# Patient Record
Sex: MALE
[De-identification: names, ages, dates, MRNs, and addresses within clinical notes are randomized per-mention and may not be internally consistent; named-entity substitution may affect disease eponyms.]

---

## 2017-07-19 NOTE — C.PDOC
History Of Present Illness


Patient is a 66 y/o male presents to the emergency department for evaluation of 

lower abdominal pain radiating down to his testicles. Pt states he has 

difficulty passing urine. Otherwise, denies any n/v/d, or fever. History 

translated by RN. 





Time Seen by Provider: 17 14:47


Chief Complaint (Nursing): Male Genitourinary


History Per: Patient


History/Exam Limitations: no limitations


Onset/Duration Of Symptoms: Days


Current Symptoms Are (Timing): Still Present


Quality Of Discomfort: "Pain"


Associated Symptoms: Urinary Symptoms.  denies: Fever, Chills, Nausea, Vomiting

, Diarrhea, Loss Of Appetite, Back Pain, Chest Pain, Constipation


Alleviating Factors: None


Recent travel outside of the [place]: No


Additional History Per: Patient





Past Medical History


Reviewed: Historical Data, Nursing Documentation, Vital Signs


Vital Signs: 


 Last Vital Signs











Temp  98.5 F   17 18:18


 


Pulse  81   17 18:18


 


Resp  18   17 18:18


 


BP  134/90   17 18:18


 


Pulse Ox  95   17 18:44














- Medical History


PMH: Atrial Fibrillation, Diabetes, HTN, Hypercholesterolemia, Hypothyroidism


   Denies: Chronic Kidney Disease


Surgical History: Cholecystectomy


Family History: States: Unknown Family Hx





- Social History


Hx Alcohol Use: No


Hx Substance Use: No





- Immunization History


Hx Tetanus Toxoid Vaccination: No


Hx Influenza Vaccination: No


Hx Pneumococcal Vaccination: No





Review Of Systems


Except As Marked, All Systems Reviewed And Found Negative.


Constitutional: Negative for: Fever, Chills


Gastrointestinal: Positive for: Abdominal Pain.  Negative for: Nausea, Vomiting

, Diarrhea


Genitourinary: Negative for: Dysuria, Frequency, Hematuria, Penile Discharge, 

Rash, Penile Pain


Musculoskeletal: Negative for: Back Pain


Skin: Negative for: Rash





Physical Exam





- Physical Exam


Appears: Non-toxic, No Acute Distress


Skin: Normal Color, Warm, Dry


Head: Atraumatic, Normacephalic


Eye(s): bilateral: Normal Inspection


Neck: Supple


Chest: Symmetrical


Cardiovascular: Rhythm Regular, No Murmur


Respiratory: Normal Breath Sounds, No Accessory Muscle Use, No Rales, No Rhonchi

, No Wheezing


Gastrointestinal/Abdominal: Soft, No Tenderness, No Guarding, No Rebound, Other 

(obese abdomen)


Male Genital: No Testicular Tenderness, No Testicular Swelling, No Inguinal 

Tenderness, No Inguinal Swelling, No Scrotal Swelling


Extremity: Bilateral: Atraumatic, Normal ROM


Neurological/Psych: Oriented x3, Normal Speech, Normal Cognition





ED Course And Treatment





- Laboratory Results


Result Diagrams: 


 17 16:07





 17 16:07


Lab Interpretation: No Acute Changes


ECG: Interpreted By Me


ECG Rhythm: Atrial Fibrillation


ECG Interpretation: No Acute Changes


Rate From EC


O2 Sat by Pulse Oximetry: 95


Pulse Ox Interpretation: Normal





- CT Scan/US


  ** No standard instances


Other Rad Studies (CT/US): Read By Radiologist, Radiology Report Reviewed


CT/US Interpretation: FINDINGS:  There is limited evaluation of the solid 

organs without the administration of IV contrast.  LOWER THORAX:  No focal 

consolidation. No pleural effusion.  No pneumothorax.  Partially imaged 

cardiomegaly.  Small hiatal hernia.  LIVER:  Unremarkable unenhanced 

appearance.  GALLBLADDER AND BILE DUCTS:  Cholecystectomy.  PANCREAS:  Fatty 

atrophy of the pancreas.  SPLEEN:  Unremarkable unenhanced appearance.  ADRENALS

:  Unremarkable.  KIDNEYS AND URETERS:  No hydronephrosis or obstructing renal 

calculus. 2.2 cm exophytic right upper pole renal lesion measures approximately 

16 HU, indeterminate. Right upper pole 2.4 cm right upper pole renal lesion 

measures approximately 9 HU, compatible with a cyst.  BLADDER:  Decompressed 

urinary bladder precludes adequate evaluation.  REPRODUCTIVE:  The prostate 

gland measures approximately 4.5 x 4.5 cm.  APPENDIX:  The appendix appears 

within normal limits of caliber. No secondary signs of acute appendicitis.  

BOWEL:  The stomach is nondistended.  Lack of oral contrast limits evaluation 

for bowel pathology.   The bowel loops appear within normal limits of caliber 

without evidence of intestinal obstruction.  PERITONEUM:  No significant free 

fluid. No definite free air.  LYMPH NODES:  No bulky lymphadenopathy 

identified.  VASCULATURE:  Atherosclerotic calcifications of the aorta. No 

aortic aneurysm.  BONES:  Osseous demineralization.  Degenerative changes.  

Vacuum disc phenomenon at T12-L1, L1-L2, and L2-L3.  OTHER FINDINGS:  Fat 

containing umbilical hernia.  IMPRESSION:  2.2 cm exophytic right upper pole 

renal lesion measures approximately 16 HU, indeterminate. Right upper pole 2.4 

cm right upper pole renal lesion measures approximately 9 HU, compatible with a 

cyst.  Cholecystectomy.  Enlarged prostate gland.  Recommend correlation with 

PSA.  Fat containing umbilical hernia.  Additional findings as above.


Progress Note: Urinalysis, blood work,  EKG, Abd & pelvis CT ordered and 

reviewed. Patient was given IV fluids. Spoke with Dr. Hood who agrees with 

plan and discharge.


Reassessment Condition: Unchanged





- Physician Consult Information


Physician Contacted: Reg Hood


Outcome Of Conversation: refer to urologist Dr Lim





Disposition





- Disposition


Referrals: 


Yogi Gonzalez MD [Staff Provider] - 


Reg Hood MD [IM] - 


Disposition: HOME/ ROUTINE


Disposition Time: 18:00


Condition: GOOD


Additional Instructions: 


Return to ED if any increase symptoms


Instructions:  Benign Prostatic Hypertrophy (ED)


Print Language: Serbian





- Clinical Impression


Clinical Impression: 


 Abdominal pain, BPH (benign prostatic hyperplasia)








- PA / NP / Resident Statement


MD/DO has reviewed & agrees with the documentation as recorded.





- Scribe Statement


The provider has reviewed the documentation as recorded by the Sonalibnava Dial





All medical record entries made by the Sonalibnava were at my direction and 

personally dictated by me. I have reviewed the chart and agree that the record 

accurately reflects my personal performance of the history, physical exam, 

medical decision making, and the department course for this patient. I have 

also personally directed, reviewed, and agree with the discharge instructions 

and disposition.

## 2017-07-19 NOTE — CT
PROCEDURE:  CT Abdomen and Pelvis without Oral or IV contrast.



HISTORY:

Pain



COMPARISON:

None available.



TECHNIQUE:

Contiguous axial images of the abdomen and pelvis. No oral or IV 

contrast administered. Coronal and Sagittal reformats generated. 



Radiation dose:



Total exam DLP = 1170.58 mGy-cm.



This CT exam was performed using one or more of the following dose 

reduction techniques: Automated exposure control, adjustment of the 

mA and/or kV according to patient size, and/or use of iterative 

reconstruction technique.



FINDINGS:

There is limited evaluation of the solid organs without the 

administration of IV contrast.



LOWER THORAX:

No focal consolidation. No pleural effusion.  No pneumothorax.  



Partially imaged cardiomegaly. 



Small hiatal hernia. 



LIVER:

Unremarkable unenhanced appearance.



GALLBLADDER AND BILE DUCTS:

Cholecystectomy. 



PANCREAS:

Fatty atrophy of the pancreas.



SPLEEN:

Unremarkable unenhanced appearance. 



ADRENALS:

Unremarkable. 



KIDNEYS AND URETERS:

No hydronephrosis or obstructing renal calculus. 2.2 cm exophytic 

right upper pole renal lesion measures approximately 16 HU, 

indeterminate. Right upper pole 2.4 cm right upper pole renal lesion 

measures approximately 9 HU, compatible with a cyst. 



BLADDER:

Decompressed urinary bladder precludes adequate evaluation.



REPRODUCTIVE:

The prostate gland measures approximately 4.5 x 4.5 cm.



APPENDIX:

The appendix appears within normal limits of caliber. No secondary 

signs of acute appendicitis.



BOWEL:

The stomach is nondistended. 



Lack of oral contrast limits evaluation for bowel pathology.   The 

bowel loops appear within normal limits of caliber without evidence 

of intestinal obstruction.



PERITONEUM:

No significant free fluid. No definite free air.



LYMPH NODES:

No bulky lymphadenopathy identified.



VASCULATURE:

Atherosclerotic calcifications of the aorta. No aortic aneurysm. 



BONES:

Osseous demineralization.  Degenerative changes.  Vacuum disc 

phenomenon at T12-L1, L1-L2, and L2-L3.



OTHER FINDINGS:

Fat containing umbilical hernia. 



IMPRESSION:

2.2 cm exophytic right upper pole renal lesion measures approximately 

16 HU, indeterminate. Right upper pole 2.4 cm right upper pole renal 

lesion measures approximately 9 HU, compatible with a cyst.  



Cholecystectomy. 



Enlarged prostate gland.  Recommend correlation with PSA. 



Fat containing umbilical hernia. 



Additional findings as above.

## 2017-07-20 NOTE — CARD
--------------- APPROVED REPORT --------------





EKG Measurement

Heart [id number]

[id number]

[id number]

[id number]



<Conclusion>

Atrial fibrillation with premature ventricular or aberrantly 

conducted complexes

Abnormal ECG

## 2018-08-06 ENCOUNTER — HOSPITAL ENCOUNTER (INPATIENT)
Dept: HOSPITAL 31 - C.ER | Age: 68
LOS: 4 days | Discharge: HOME | DRG: 683 | End: 2018-08-10
Attending: INTERNAL MEDICINE | Admitting: INTERNAL MEDICINE
Payer: MEDICARE

## 2018-08-06 VITALS — RESPIRATION RATE: 20 BRPM

## 2018-08-06 VITALS — BODY MASS INDEX: 39.5 KG/M2

## 2018-08-06 DIAGNOSIS — I50.30: ICD-10-CM

## 2018-08-06 DIAGNOSIS — N18.3: ICD-10-CM

## 2018-08-06 DIAGNOSIS — R80.9: ICD-10-CM

## 2018-08-06 DIAGNOSIS — N17.9: Primary | ICD-10-CM

## 2018-08-06 DIAGNOSIS — T36.95XA: ICD-10-CM

## 2018-08-06 DIAGNOSIS — E03.9: ICD-10-CM

## 2018-08-06 DIAGNOSIS — E66.9: ICD-10-CM

## 2018-08-06 DIAGNOSIS — I48.91: ICD-10-CM

## 2018-08-06 DIAGNOSIS — M10.9: ICD-10-CM

## 2018-08-06 DIAGNOSIS — M25.571: ICD-10-CM

## 2018-08-06 DIAGNOSIS — E11.22: ICD-10-CM

## 2018-08-06 DIAGNOSIS — Z79.4: ICD-10-CM

## 2018-08-06 DIAGNOSIS — E78.00: ICD-10-CM

## 2018-08-06 DIAGNOSIS — Z79.01: ICD-10-CM

## 2018-08-06 DIAGNOSIS — I13.0: ICD-10-CM

## 2018-08-06 DIAGNOSIS — Z87.891: ICD-10-CM

## 2018-08-06 DIAGNOSIS — E87.6: ICD-10-CM

## 2018-08-06 LAB
ALBUMIN SERPL-MCNC: 4.4 G/DL (ref 3.5–5)
ALBUMIN/GLOB SERPL: 1 {RATIO} (ref 1–2.1)
ALT SERPL-CCNC: 31 U/L (ref 21–72)
APTT BLD: 41 SECONDS (ref 21–34)
AST SERPL-CCNC: 32 U/L (ref 17–59)
BACTERIA #/AREA URNS HPF: (no result) /[HPF]
BASOPHILS # BLD AUTO: 0.1 K/UL (ref 0–0.2)
BASOPHILS NFR BLD: 0.6 % (ref 0–2)
BILIRUB UR-MCNC: NEGATIVE MG/DL
BNP SERPL-MCNC: 1250 PG/ML (ref 0–900)
BUN SERPL-MCNC: 106 MG/DL (ref 9–20)
CALCIUM SERPL-MCNC: 9.4 MG/DL (ref 8.6–10.4)
EOSINOPHIL # BLD AUTO: 0 K/UL (ref 0–0.7)
EOSINOPHIL NFR BLD: 0.5 % (ref 0–4)
ERYTHROCYTE [DISTWIDTH] IN BLOOD BY AUTOMATED COUNT: 12.5 % (ref 11.5–14.5)
GFR NON-AFRICAN AMERICAN: 18
GLUCOSE UR STRIP-MCNC: NORMAL MG/DL
HGB BLD-MCNC: 14.2 G/DL (ref 12–18)
INR PPP: 2.5
LEUKOCYTE ESTERASE UR-ACNC: (no result) LEU/UL
LYMPHOCYTES # BLD AUTO: 0.9 K/UL (ref 1–4.3)
LYMPHOCYTES NFR BLD AUTO: 10.9 % (ref 20–40)
MCH RBC QN AUTO: 30.7 PG (ref 27–31)
MCHC RBC AUTO-ENTMCNC: 34.8 G/DL (ref 33–37)
MCV RBC AUTO: 88.1 FL (ref 80–94)
MONOCYTES # BLD: 0.9 K/UL (ref 0–0.8)
MONOCYTES NFR BLD: 11 % (ref 0–10)
NEUTROPHILS # BLD: 6.6 K/UL (ref 1.8–7)
NEUTROPHILS NFR BLD AUTO: 77 % (ref 50–75)
NRBC BLD AUTO-RTO: 0 % (ref 0–2)
PH UR STRIP: 5 [PH] (ref 5–8)
PLATELET # BLD: 184 K/UL (ref 130–400)
PMV BLD AUTO: 10.7 FL (ref 7.2–11.7)
PROT UR STRIP-MCNC: (no result) MG/DL
PROTHROMBIN TIME: 27 SECONDS (ref 9.7–12.2)
RBC # BLD AUTO: 4.63 MIL/UL (ref 4.4–5.9)
RBC # UR STRIP: NEGATIVE /UL
SP GR UR STRIP: 1.01 (ref 1–1.03)
SQUAMOUS EPITHIAL: < 1 /HPF (ref 0–5)
TROPONIN I SERPL-MCNC: 0.03 NG/ML (ref 0–0.12)
URATE SERPL-MCNC: 15.9 MG/DL (ref 3.5–8.5)
UROBILINOGEN UR-MCNC: NORMAL MG/DL (ref 0.2–1)
WBC # BLD AUTO: 8.6 K/UL (ref 4.8–10.8)

## 2018-08-06 RX ADMIN — OXYCODONE HYDROCHLORIDE AND ACETAMINOPHEN PRN TAB: 5; 325 TABLET ORAL at 22:07

## 2018-08-06 RX ADMIN — HUMAN INSULIN SCH: 100 INJECTION, SOLUTION SUBCUTANEOUS at 21:11

## 2018-08-06 NOTE — CP.PCM.HP
History of Present Illness





- History of Present Illness


History of Present Illness: 





Patient is admitted for progressive renal failure and decreasing urine output 

and right ankle pain.


Patient has a history of hypertension and atrial fibrillation history of 

diastolic heart failure type 2 diabetes recently patients creatinine had gone 

up to 1.5-1.6.  [place] and came back after 1 

month and a repeat blood test showed creatinine had gone up to 3.4.  Patient 

was also complaining of urinary symptoms and outpatient urine culture were 

positive for E. coli more than 100,000.  Patient was given Augmentin which was 

sensitive to E. coli.  Patient took some antibiotics and started complaining of 

more decreasing urine output and swelling and pain on the right ankle patient 

stated that the pain in the right ankle has been present for the last few 

months.  In the emergency room patients creatinine was 3.5 and BUN was 101 

with a potassium of 2.9.  Patient is admitted now for further evaluation.





Present on Admission





- Present on Admission


Any Indicators Present on Admission: No





Past Patient History





- Past Medical History & Family History


Past Medical History?: Yes





- Past Social History


Smoking Status: Former Smoker





- CARDIAC


Hx Atrial Fibrillation: Yes


Hx Hypercholesterolemia: Yes


Hx Hypertension: Yes





- PULMONARY


Hx Respiratory Disorders: No





- NEUROLOGICAL


Hx Neurological Disorder: Yes


Hx Dizziness: Yes


Hx Syncope: Yes





- HEENT


Hx HEENT Problems: No





- RENAL


Hx Chronic Kidney Disease: No





- ENDOCRINE/METABOLIC


Hx Hypothyroidism: Yes





- HEMATOLOGICAL/ONCOLOGICAL


Hx Blood Disorders: No


Hx Blood Transfusions: No





- INTEGUMENTARY


Hx Dermatological Problems: No





- MUSCULOSKELETAL/RHEUMATOLOGICAL


Hx Musculoskeletal Disorders: Yes


Hx Falls: Yes





- GASTROINTESTINAL


Hx Gastrointestinal Disorders: No





- GENITOURINARY/GYNECOLOGICAL


Hx Genitourinary Disorders: No





- PSYCHIATRIC


Hx Substance Use: No





- SURGICAL HISTORY


Hx Cholecystectomy: Yes





- ANESTHESIA


Hx Anesthesia: Yes


Hx Anesthesia Reactions: No





Meds


Allergies/Adverse Reactions: 


 Allergies











Allergy/AdvReac Type Severity Reaction Status Date / Time


 


No Known Allergies Allergy   Verified 08/06/18 13:09














Results





- Vital Signs


Recent Vital Signs: 





 Last Vital Signs











Temp  99.7 F H  08/06/18 13:17


 


Pulse  87   08/06/18 15:33


 


Resp  20   08/06/18 15:33


 


BP  111/60   08/06/18 15:33


 


Pulse Ox  100   08/06/18 15:33














- Labs


Result Diagrams: 


 08/09/18 07:13





 08/09/18 07:13


Labs: 





 Laboratory Results - last 24 hr











  08/06/18 08/06/18 08/06/18





  14:00 14:00 14:00


 


WBC  8.6  D  


 


RBC  4.63  


 


Hgb  14.2  


 


Hct  40.8  


 


MCV  88.1  


 


MCH  30.7  


 


MCHC  34.8  


 


RDW  12.5  


 


Plt Count  184  


 


MPV  10.7  


 


Neut % (Auto)  77.0 H  


 


Lymph % (Auto)  10.9 L  


 


Mono % (Auto)  11.0 H  


 


Eos % (Auto)  0.5  


 


Baso % (Auto)  0.6  


 


Neut # (Auto)  6.6  


 


Lymph # (Auto)  0.9 L  


 


Mono # (Auto)  0.9 H  


 


Eos # (Auto)  0.0  


 


Baso # (Auto)  0.1  


 


PT   27.0 H 


 


INR   2.5 


 


APTT   41 H 


 


Sodium    139


 


Potassium    2.9 L


 


Chloride    91 L


 


Carbon Dioxide    32 H


 


Anion Gap    19


 


BUN    106 H* D


 


Creatinine    3.4 H


 


Est GFR ( Amer)    22


 


Est GFR (Non-Af Amer)    18


 


Random Glucose    149 H


 


Uric Acid    15.9 H


 


Calcium    9.4


 


Total Bilirubin    1.1


 


AST    32


 


ALT    31


 


Alkaline Phosphatase    73


 


Troponin I    0.0280


 


NT-Pro-B Natriuret Pep    1250 H


 


Total Protein    8.9 H


 


Albumin    4.4


 


Globulin    4.5 H


 


Albumin/Globulin Ratio    1.0

## 2018-08-06 NOTE — C.PDOC
History Of Present Illness


67 y/o male presents to the ED complaining of pain in the dorsum of right foot 

for the last 3 days. He denies having had similar symptoms in the past. Patient 

has tried taking anything for pain relief at home. Otherwise he denies any 

numbness, tingling, or weakness.





Time Seen by Provider: 18 13:24


Chief Complaint (Nursing): Upper Extremity Problem/Injury


History Per: Patient


History/Exam Limitations: no limitations


Onset/Duration Of Symptoms: Days


Current Symptoms Are (Timing): Still Present





Past Medical History


Reviewed: Historical Data, Nursing Documentation, Vital Signs


Vital Signs: 


 Last Vital Signs











Temp  99.7 F H  18 13:17


 


Pulse  87   18 15:33


 


Resp  20   18 15:33


 


BP  111/60   18 15:33


 


Pulse Ox  100   18 15:33














- Medical History


PMH: Atrial Fibrillation, Diabetes, HTN, Hypercholesterolemia, Hypothyroidism


   Denies: Chronic Kidney Disease


Surgical History: Cholecystectomy


Family History: States: Unknown Family Hx





- Social History


Hx Alcohol Use: No


Hx Substance Use: No





- Immunization History


Hx Tetanus Toxoid Vaccination: No


Hx Influenza Vaccination: No


Hx Pneumococcal Vaccination: No





Review Of Systems


Except As Marked, All Systems Reviewed And Found Negative.


Constitutional: Negative for: Fever


Musculoskeletal: Positive for: Foot Pain


Skin: Negative for: Rash, Lesions


Neurological: Negative for: Weakness, Numbness, Incoordination





Physical Exam





- Physical Exam


Appears: Non-toxic, No Acute Distress, Other (Obese  male)


Skin: Normal Color, Warm, No Rash


Head: Atraumatic, Normacephalic


Eye(s): bilateral: Normal Inspection


Oral Mucosa: Moist


Neck: Normal ROM, Supple


Chest: Symmetrical


Cardiovascular: Rhythm Regular, No Murmur


Respiratory: Normal Breath Sounds, No Rales, No Rhonchi, No Wheezing


Extremity: Normal ROM, Tenderness (to the dorsum of right foot), No Swelling, 

Other (No surrounding erythema or leg edema)


Pulses: Left Dorsalis Pedis: Normal, Right Dorsalis Pedis: Normal


Neurological/Psych: Oriented x3, Normal Speech, Normal Cranial Nerves





ED Course And Treatment





- Laboratory Results


Result Diagrams: 


 18 14:00





 18 14:00


Lab Interpretation: Abnormal (acute renal insuff c/w  BUN/Creat 106/3.4 H, 

+ hypokalemia)


ECG: Interpreted By Me


ECG Rhythm: Atrial Fibrillation


ECG Interpretation: Abnormal


Rate From EC


O2 Sat by Pulse Oximetry: 95 (RA)


Pulse Ox Interpretation: Normal





- Radiology


CXR: Interpreted by Me


CXR Interpretation: Yes: Heart Size





- Other Rad


  ** R foot


X-Ray: Interpreted by Me (no fx/disloc )


Reevaluation Time: 14:59


Reassessment Condition: Improved





- Physician Consult Information


Outcome Of Conversation: 1500: d/w Dr. Hood- ok to admit





Medical Decision Making


Medical Decision Making: 


R dorsal foot pain 


? gouty


Colchicine started


Toradol for pain 


Consider indomethacin, but with acute renal insuff may consider steroids and 

not NSAIDS for pain .





Hypotention and hypokalemia probably medication related


begin repleating





ARF, BUN/Creat 106/3.4 from baseline 31/1.9  from 2017


May be pre-renal- 


no chf on exam or CXR


hydration and monitor











Disposition


Doctor Will See Patient In The: Hospital


Counseled Patient/Family Regarding: Studies Performed, Diagnosis





- Disposition


Disposition: HOSPITALIZED


Disposition Time: 15:03


Condition: GOOD





- POA


Present On Arrival: None





- Clinical Impression


Clinical Impression: 


 Gout of foot due to renal impairment, Acute renal insufficiency, Hypokalemia








- Scribe Statement


The provider has reviewed the documentation as recorded by the Scribe (Kiana Mojica)


Provider Attestation: 





All medical record entries made by the Scribe were at my direction and 

personally dictated by me. I have reviewed the chart and agree that the record 

accurately reflects my personal performance of the history, physical exam, 

medical decision making, and the department course for this patient. I have 

also personally directed, reviewed, and agree with the discharge instructions 

and disposition.

## 2018-08-06 NOTE — RAD
Date of service: 



08/06/2018



PROCEDURE:  Right Foot Radiographs.



HISTORY:

dorsum R foot, no trauma, ? gout  



COMPARISON:

None.



FINDINGS:



BONES:

No evidence of acute displaced fracture nor dislocation. Small 

triangular-shaped bony density adjacent to the medial aspect base 

distal phalanx great toe could represent old incompletely fused 

fracture deformity. .  There is dorsal and plantar surface calcaneal 

enthesophyte formation. 



JOINTS:

Mild multi articular degenerative osteoarthritis 



SOFT TISSUES:

Calcification of the plantar fascia is present 



OTHER FINDINGS:

None.



IMPRESSION:

No evidence of acute displaced fracture nor dislocation. Small 

triangular-shaped bony density adjacent to the medial aspect base 

distal phalanx great toe could represent old incompletely fused 

fracture deformity. .  There is dorsal and plantar surface calcaneal 

enthesophyte formation.  



Mild multi articular degenerative osteoarthritis. . 



Calcification of the plantar fascia

## 2018-08-06 NOTE — RAD
Chest x-ray single frontal view 



History: Chest pain. 



Comparison: 07/20/2016 



Findings: 



Biapical pleural thickening with upper lobe granulomatous changes. 



Mild venous congestion. 



Right hilar prominence. 



Patchy increased markings at the left lung base with small left 

pleural effusion. 



Cardiomegaly. 



Enlarged ectatic aorta. 



Degenerative changes in the spine and shoulders. 



Impression:



Biapical pleural thickening with upper lobe granulomatous changes. 



Mild venous congestion. 



Right hilar prominence. 



Patchy increased markings at the left lung base with small left 

pleural effusion. 



Cardiomegaly. 



Enlarged ectatic aorta.

## 2018-08-07 LAB
ALBUMIN SERPL-MCNC: 4.2 G/DL (ref 3.5–5)
ALBUMIN/GLOB SERPL: 1.1 {RATIO} (ref 1–2.1)
ALT SERPL-CCNC: 27 U/L (ref 21–72)
AST SERPL-CCNC: 26 U/L (ref 17–59)
BASOPHILS # BLD AUTO: 0.1 K/UL (ref 0–0.2)
BASOPHILS NFR BLD: 0.9 % (ref 0–2)
BILIRUB UR-MCNC: NEGATIVE MG/DL
BUN SERPL-MCNC: 101 MG/DL (ref 9–20)
CALCIUM SERPL-MCNC: 8.8 MG/DL (ref 8.6–10.4)
EOSINOPHIL # BLD AUTO: 0.2 K/UL (ref 0–0.7)
EOSINOPHIL NFR BLD: 2.9 % (ref 0–4)
ERYTHROCYTE [DISTWIDTH] IN BLOOD BY AUTOMATED COUNT: 12.6 % (ref 11.5–14.5)
GFR NON-AFRICAN AMERICAN: 20
GLUCOSE UR STRIP-MCNC: NORMAL MG/DL
HGB BLD-MCNC: 13.8 G/DL (ref 12–18)
INR PPP: 3
LEUKOCYTE ESTERASE UR-ACNC: (no result) LEU/UL
LYMPHOCYTES # BLD AUTO: 1.5 K/UL (ref 1–4.3)
LYMPHOCYTES NFR BLD AUTO: 22.9 % (ref 20–40)
MCH RBC QN AUTO: 31.2 PG (ref 27–31)
MCHC RBC AUTO-ENTMCNC: 34.6 G/DL (ref 33–37)
MCV RBC AUTO: 90 FL (ref 80–94)
MONOCYTES # BLD: 0.7 K/UL (ref 0–0.8)
MONOCYTES NFR BLD: 10.3 % (ref 0–10)
NEUTROPHILS # BLD: 4.2 K/UL (ref 1.8–7)
NEUTROPHILS NFR BLD AUTO: 63 % (ref 50–75)
NRBC BLD AUTO-RTO: 0 % (ref 0–2)
PH UR STRIP: 6 [PH] (ref 5–8)
PLATELET # BLD: 172 K/UL (ref 130–400)
PMV BLD AUTO: 10.8 FL (ref 7.2–11.7)
PROT UR STRIP-MCNC: (no result) MG/DL
PROTHROMBIN TIME: 32.9 SECONDS (ref 9.7–12.2)
RBC # BLD AUTO: 4.42 MIL/UL (ref 4.4–5.9)
RBC # UR STRIP: NEGATIVE /UL
SP GR UR STRIP: 1.01 (ref 1–1.03)
SQUAMOUS EPITHIAL: 1 /HPF (ref 0–5)
UROBILINOGEN UR-MCNC: NORMAL MG/DL (ref 0.2–1)
WBC # BLD AUTO: 6.6 K/UL (ref 4.8–10.8)

## 2018-08-07 RX ADMIN — HUMAN INSULIN SCH: 100 INJECTION, SOLUTION SUBCUTANEOUS at 07:43

## 2018-08-07 RX ADMIN — HUMAN INSULIN SCH UNITS: 100 INJECTION, SOLUTION SUBCUTANEOUS at 12:20

## 2018-08-07 RX ADMIN — HUMAN INSULIN SCH: 100 INJECTION, SOLUTION SUBCUTANEOUS at 17:11

## 2018-08-07 RX ADMIN — HUMAN INSULIN SCH: 100 INJECTION, SOLUTION SUBCUTANEOUS at 21:07

## 2018-08-07 NOTE — CP.PCM.CON
History of Present Illness





- History of Present Illness


History of Present Illness: 


Nephrology Consultation Note:





Assessment: Stable


Acute Kidney Injury (N17.9) ? etiology, possibly due to pre--renal state. other 

differential include AIN due to recent Abx


Hypokalemia


Chronic Kidney Disease (N18.3) Stage3  with ? mg proteinuria (R80.9) ? due to 


Obesity, A fib on coumadin


Hyperuricemia


Hypothyroidism





Plan


No acute need for renal replacement therapy at this time. 


BP controlled without meds. Maintain hemodynamics stable. Avoid hypotension. 

Patient not on ACEI/ARB due to recent IRIS


Monitor Input/Output, daily weights and renal function with basic metabolic 

panel


continue with IVF as NS


d/c metformin due to serum Cr elevation. consider alternative meds to control 

sugar. check a1c 


can add uloric 40 mg/day for hyperuricemia. (deferred allopurniol due to 

interaction with coumadin but uloric is non-formulary)


hold lasix


supplement KDUR





Check urine analysis, spot protein/creatinine, albumin/creatinine ratio, urine 

for eosinophils. Check CPK, uric acid, renal and bladder sonogram


Check for 25-OH vitamin D, iPTH, phosphorus level. 





Dose meds/antibiotics for reduced GFR. Avoid fleets enema/magnesium based 

laxatives. Avoid nephrotoxins/NSAIDs/ iodinated contrast (unless needed 

emergently)


Glycemic control


Further work up/management as per primary team





Thanks for allowing me to participate in care of your patient. Will follow 

patient with you. Please call if any Qs. had d/w team and son with pt permission


Dr Tam Luis


Office: [phone] Cell: [phone] Fax: [phone]





Chief Complaint; kidney problem


Reason for consult: Acute Kidney Injury


HPI: Pt is a 68 M with hx of borderline diabetes Mellitus, Hypothyroidism, A 

fib on coumadin, CKD stage 3 with baseline cr 1.5-2.0 since 2016, chronic edema 

on lasix presented with complaints of foot pain and also found to have IRIS 

hence renal consulted. pt or son not aware about kidney disease in past


Denies OTC/herbal meds or NSAIDs


No recent iodinated contrast exposure. No obvious episodes of low BP.


pt reports decreased appetite for last 1-2 days and was recently treated with 

an antibiotics for UTI





ROS: 


Cardiovascular: No chest pain. 


Pulmonary: No shortness of breath 


Gastrointestinal: denies abdominal pain No nausea. No vomiting. 


Genitourinary: No pain while urinating. Denies blood in urine. reports protate 

issues in past


All other negative except as mentioned in HPI





Physical Examination:      


General Appearance: Comfortable, in no acute respiratory distress, co-operative 

. 


Vitals reviewed and noted as below


Head; Atraumatic, normocephalic


ENT: no ulcers no thrush. Tongue is midline. Oropharynx: no rash or ulcers.


EYES: Pupils are equal, round and reactive to light accommodation. Eye muscles 

and extraocular movement intact. Sclera is anicteric.


Neck; supple no lymphadenopathy, no thyromegaly or bruit


Lungs: Normal respiratory rate/effort. Breath sounds bilateral equal and clear


Heart: Normal rate. s1s2 normal. No rub or gallop. 


Extremities: no edema. No varicose veins. chronic hyperpigmented changes in leg 

skin noted


Neurological: Patient is alert, awake and oriented to person, place and time. 

No focal deficit. Strength bilateral appropriate and equal


Skin: Warm and dry. Normal turgor. No rash. Palpitation: Normal elasticity for 

age


Abdomen: Abdomen is soft. Bowel sounds +. There is no abdominal tenderness, no 

guarding/rigidity no organomegaly


Psych: limited insight and normal affect/mood


MSK: no joint tenderness or swelling. Digits and nails normal, no deformity


: kidney or bladder not palpable





Labs/imaging reviewed.


Past medical history, past surgical history, family history, social history, 

allergy reviewed and noted as below


Family hx: no hx of CKD. Rest non-contributory 





renal imaging b/l medical renal disease with increased echogenicity. no 

hydronephrosis.


uric acid 15.9


UA 2+ protein





Past Patient History





- Past Medical History & Family History


Past Medical History?: Yes





- Past Social History


Smoking Status: Former Smoker





- CARDIAC


Hx Atrial Fibrillation: Yes


Hx Hypercholesterolemia: Yes


Hx Hypertension: Yes





- PULMONARY


Hx Respiratory Disorders: No





- NEUROLOGICAL


Hx Neurological Disorder: Yes


Hx Dizziness: Yes


Hx Syncope: Yes





- HEENT


Hx HEENT Problems: No





- RENAL


Hx Chronic Kidney Disease: No





- ENDOCRINE/METABOLIC


Hx Hypothyroidism: Yes





- HEMATOLOGICAL/ONCOLOGICAL


Hx Blood Disorders: No


Hx Blood Transfusions: No





- INTEGUMENTARY


Hx Dermatological Problems: No





- MUSCULOSKELETAL/RHEUMATOLOGICAL


Hx Falls: Yes





- GASTROINTESTINAL


Hx Gastrointestinal Disorders: No





- GENITOURINARY/GYNECOLOGICAL


Hx Genitourinary Disorders: No





- PSYCHIATRIC


Hx Substance Use: No





- SURGICAL HISTORY


Hx Cholecystectomy: Yes


Hx Herniorrhaphy: Yes


Other/Comment: brain tumor removal in [place]





- ANESTHESIA


Hx Anesthesia: Yes


Hx Anesthesia Reactions: No





Meds


Allergies/Adverse Reactions: 


 Allergies











Allergy/AdvReac Type Severity Reaction Status Date / Time


 


No Known Allergies Allergy   Verified 08/06/18 13:09














- Medications


Medications: 


 Current Medications





Sodium Chloride (Sodium Chloride 0.9%)  1,000 mls @ 100 mls/hr IV .Q10H Pending sale to [facility]


   Last Admin: 08/07/18 14:08 Dose:  100 mls/hr


Insulin Human Regular (Novolin R)  0 unit SC ACHS Pending sale to [facility]


   PRN Reason: Protocol


   Last Admin: 08/07/18 12:20 Dose:  3 units


Levothyroxine Sodium (Synthroid)  200 mcg PO DAILY@0630 Pending sale to [facility]


   Last Admin: 08/07/18 07:00 Dose:  200 mcg


Oxycodone/Acetaminophen (Percocet 5/325 Mg Tab)  1 tab PO Q6H PRN


   PRN Reason: Pain, severe (8-10)


   Stop: 08/09/18 21:16


   Last Admin: 08/06/18 22:07 Dose:  1 tab


Pneumococcal Polyvalent Vaccine (Pneumovax 23 Vaccine)  0.5 ml IM .ONCE ONE


   Stop: 08/08/18 10:01


Warfarin Sodium (Coumadin)  6 mg PO DAILY@1800 ONE


   Stop: 08/07/18 18:01











Results





- Vital Signs


Recent Vital Signs: 


 Last Vital Signs











Temp  98.1 F   08/07/18 07:00


 


Pulse  91 H  08/07/18 07:15


 


Resp  20   08/07/18 07:00


 


BP  113/69   08/07/18 07:00


 


Pulse Ox  99   08/07/18 07:00














- Labs


Result Diagrams: 


 08/07/18 07:02





 08/07/18 07:02


Labs: 


 Laboratory Results - last 24 hr











  08/06/18 08/06/18 08/06/18





  13:11 16:45 20:39


 


WBC   


 


RBC   


 


Hgb   


 


Hct   


 


MCV   


 


MCH   


 


MCHC   


 


RDW   


 


Plt Count   


 


MPV   


 


Neut % (Auto)   


 


Lymph % (Auto)   


 


Mono % (Auto)   


 


Eos % (Auto)   


 


Baso % (Auto)   


 


Neut # (Auto)   


 


Lymph # (Auto)   


 


Mono # (Auto)   


 


Eos # (Auto)   


 


Baso # (Auto)   


 


PT   


 


INR   


 


Sodium   


 


Potassium   


 


Chloride   


 


Carbon Dioxide   


 


Anion Gap   


 


BUN   


 


Creatinine   


 


Est GFR ( Amer)   


 


Est GFR (Non-Af Amer)   


 


POC Glucose (mg/dL)  155 H  144 H  144 H


 


Random Glucose   


 


Calcium   


 


Total Bilirubin   


 


AST   


 


ALT   


 


Alkaline Phosphatase   


 


Total Protein   


 


Albumin   


 


Globulin   


 


Albumin/Globulin Ratio   


 


Urine Color   


 


Urine Clarity   


 


Urine pH   


 


Ur Specific Gravity   


 


Urine Protein   


 


Urine Glucose (UA)   


 


Urine Ketones   


 


Urine Blood   


 


Urine Nitrate   


 


Urine Bilirubin   


 


Urine Urobilinogen   


 


Ur Leukocyte Esterase   


 


Urine WBC (Auto)   


 


Urine RBC (Auto)   


 


Ur Squamous Epith Cells   


 


Urine Bacteria   














  08/06/18 08/07/18 08/07/18





  22:09 06:42 07:02


 


WBC    6.6


 


RBC    4.42


 


Hgb    13.8


 


Hct    39.8


 


MCV    90.0


 


MCH    31.2 H


 


MCHC    34.6


 


RDW    12.6


 


Plt Count    172


 


MPV    10.8


 


Neut % (Auto)    63.0


 


Lymph % (Auto)    22.9


 


Mono % (Auto)    10.3 H


 


Eos % (Auto)    2.9


 


Baso % (Auto)    0.9


 


Neut # (Auto)    4.2


 


Lymph # (Auto)    1.5


 


Mono # (Auto)    0.7


 


Eos # (Auto)    0.2


 


Baso # (Auto)    0.1


 


PT   


 


INR   


 


Sodium   


 


Potassium   


 


Chloride   


 


Carbon Dioxide   


 


Anion Gap   


 


BUN   


 


Creatinine   


 


Est GFR ( Amer)   


 


Est GFR (Non-Af Amer)   


 


POC Glucose (mg/dL)   129 H 


 


Random Glucose   


 


Calcium   


 


Total Bilirubin   


 


AST   


 


ALT   


 


Alkaline Phosphatase   


 


Total Protein   


 


Albumin   


 


Globulin   


 


Albumin/Globulin Ratio   


 


Urine Color  Yellow  


 


Urine Clarity  Clear  


 


Urine pH  5.0  


 


Ur Specific Gravity  1.014  


 


Urine Protein  2+ H  


 


Urine Glucose (UA)  Normal  


 


Urine Ketones  Negative  


 


Urine Blood  Negative  


 


Urine Nitrate  Negative  


 


Urine Bilirubin  Negative  


 


Urine Urobilinogen  Normal  


 


Ur Leukocyte Esterase  Neg  


 


Urine WBC (Auto)  2  


 


Urine RBC (Auto)  < 1  


 


Ur Squamous Epith Cells  < 1  


 


Urine Bacteria  Rare  














  08/07/18 08/07/18 08/07/18





  07:02 11:18 14:14


 


WBC   


 


RBC   


 


Hgb   


 


Hct   


 


MCV   


 


MCH   


 


MCHC   


 


RDW   


 


Plt Count   


 


MPV   


 


Neut % (Auto)   


 


Lymph % (Auto)   


 


Mono % (Auto)   


 


Eos % (Auto)   


 


Baso % (Auto)   


 


Neut # (Auto)   


 


Lymph # (Auto)   


 


Mono # (Auto)   


 


Eos # (Auto)   


 


Baso # (Auto)   


 


PT    32.9 H* D


 


INR    3.0


 


Sodium  142  


 


Potassium  3.4 L  


 


Chloride  95 L  


 


Carbon Dioxide  31 H  


 


Anion Gap  19  


 


BUN  101 H*  


 


Creatinine  3.1 H  


 


Est GFR ( Amer)  24  


 


Est GFR (Non-Af Amer)  20  


 


POC Glucose (mg/dL)   221 H 


 


Random Glucose  134 H  


 


Calcium  8.8  


 


Total Bilirubin  0.8  


 


AST  26  


 


ALT  27  


 


Alkaline Phosphatase  66  


 


Total Protein  7.9  


 


Albumin  4.2  


 


Globulin  3.7  


 


Albumin/Globulin Ratio  1.1  


 


Urine Color   


 


Urine Clarity   


 


Urine pH   


 


Ur Specific Gravity   


 


Urine Protein   


 


Urine Glucose (UA)   


 


Urine Ketones   


 


Urine Blood   


 


Urine Nitrate   


 


Urine Bilirubin   


 


Urine Urobilinogen   


 


Ur Leukocyte Esterase   


 


Urine WBC (Auto)   


 


Urine RBC (Auto)   


 


Ur Squamous Epith Cells   


 


Urine Bacteria

## 2018-08-07 NOTE — US
Date of service: 



08/07/2018



PROCEDURE:  Ultrasound of the Kidneys



HISTORY:

IRIS



COMPARISON:

None available.



TECHNIQUE:

Sonogram of the kidneys.



FINDINGS:



RIGHT KIDNEY:

Measures: 12.9 x 5.8 x 6.1 cm. 



The renal parenchyma is echogenic with poor corticomedullary 

differentiation indicative of intrinsic medical renal disease. No 

solid mass identified, urolithiasis or hydronephrosis. A simple 

exophytic cyst is is seen related to the upper midpole measuring 2.8 

x 2.4 by 2.7 cm with an intrarenal cyst at the upper midpole 

measuring 2.7 x 2.4 x 2.9 cm.  Both appear simple. 







LEFT KIDNEY:

Measures: 10.5 x 5.5 x 5.2 cm.



Echogenic renal parenchyma is appreciate with poor corticomedullary 

differentiation compatible with intrinsic medical renal disease. No 

obstructive uropathy, cyst or solid mass or perinephric fluid 

collection. 



OTHER FINDINGS:

Urinary bladder is limited volume at 72.5 cc which is a postvoid 

residual from an indeterminate volume.  The patient voided 

immediately prior to the examination unfortunately.



IMPRESSION:

Echogenic parenchyma is appreciate with very poor corticomedullary 

differentiation suggesting intrinsic medical disease.  No obstructive 

uropathy or solid renal parenchymal mass. Two simple cysts identified 

in the upper midpole right kidney as discussed above.

## 2018-08-07 NOTE — CP.PCM.PN
Subjective





- Date & Time of Evaluation


Date of Evaluation: 08/07/18


Time of Evaluation: 14:27





- Subjective


Subjective: 





CHIEF COMPLAINTS TODAY :


Patient is complaining of lower abdominal pain and oliguria.


The pain in the right ankle is slightly less





ROS.


HEENT :  N.


Resp :       No cough, wheezing ,pleuritic CP ,or hemoptysis 


Cardio :     No anginal  CP, PND, orthopnea, palpitation 


GI :           No abd.pain, n/v ,diarrhea or GI bleeding .


CNS : No headache, vertigo, focal deficit.


Musculoskel :  No joint swelling , right ankle pain


Derm :        No rash 


Psych :     Normal affect.


Ext :  No  swelling ,calf pain 





PE.


Pt. is alert awake in no distress.


V.S  As noted in the chart 


Head ,ear nose,throat and eyes : Normal.


Neck : Supple with normal carotids.


Lungs: Clear air entry.


Heart : S1 & S2 normal with S4. No murmur.


Abd : Soft non tender with normal bowel sounds.


Neuro : Moves all ext. with no localized deficit.


Ext : No edema with intact pulses.Non tender calves .  Tenderness in the right 

ankle


Derm : No rashes or decubitus ulcer.





LABS/RADIOLOGY: BUN is 101 creatinine is 3.0 and potassium is 3.4





ASSESSMENT/PLAN : 


Awaiting nephrology evaluation continue present medication











Objective





- Vital Signs/Intake and Output


Vital Signs (last 24 hours): 


 











Temp Pulse Resp BP Pulse Ox


 


 98.1 F   91 H  20   113/69   99 


 


 08/07/18 07:00  08/07/18 07:15  08/07/18 07:00  08/07/18 07:00  08/07/18 07:00








Intake and Output: 


 











 08/07/18 08/07/18





 11:59 23:59


 


Intake Total 1040 


 


Output Total 400 


 


Balance 640 














- Medications


Medications: 


 Current Medications





Sodium Chloride (Sodium Chloride 0.9%)  1,000 mls @ 100 mls/hr IV .Q10H [facility]


   Last Admin: 08/07/18 14:08 Dose:  100 mls/hr


Insulin Human Regular (Novolin R)  0 unit SC ACHS [facility]


   PRN Reason: Protocol


   Last Admin: 08/07/18 12:20 Dose:  3 units


Levothyroxine Sodium (Synthroid)  200 mcg PO DAILY@0630 [facility]


   Last Admin: 08/07/18 07:00 Dose:  200 mcg


Oxycodone/Acetaminophen (Percocet 5/325 Mg Tab)  1 tab PO Q6H PRN


   PRN Reason: Pain, severe (8-10)


   Stop: 08/09/18 21:16


   Last Admin: 08/06/18 22:07 Dose:  1 tab


Pneumococcal Polyvalent Vaccine (Pneumovax 23 Vaccine)  0.5 ml IM .ONCE ONE


   Stop: 08/08/18 10:01


Warfarin Sodium (Coumadin)  6 mg PO DAILY@1800 ONE


   Stop: 08/07/18 18:01











- Labs


Labs: 


 





 08/07/18 07:02 





 08/07/18 07:02 





 











PT  27.0 SECONDS (9.7-12.2)  H  08/06/18  14:00    


 


INR  2.5   08/06/18  14:00    


 


APTT  41 SECONDS (21-34)  H  08/06/18  14:00

## 2018-08-07 NOTE — CP.PCM.HP
History of Present Illness





- History of Present Illness


History of Present Illness: 





COMPREHENSIVE   HISTORY & PHYSICAL EXAM 


   


HPI


Patient is admitted for progressive renal failure and decreasing urine output 

and right ankle pain.


Patient has a history of hypertension and atrial fibrillation history of 

diastolic heart failure type 2 diabetes recently patients creatinine had gone 

up to 1.5-1.6.  Recently patient went [place] country and came back after 1 

month and a repeat blood test showed creatinine had gone up to 3.4.  Patient 

was also complaining of urinary symptoms and outpatient urine culture were 

positive for E. coli more than 100,000.  Patient was given Augmentin which was 

sensitive to E. coli.  Patient took some antibiotics and started complaining of 

more decreasing urine output and swelling and pain on the right ankle patient 

stated that the pain in the right ankle has been present for the last few 

months.  In the emergency room patients creatinine was 3.5 and BUN was 101 

with a potassium of 2.9.  Patient is admitted now for further evaluation.





PAST HIST.


PERSONAL HIST:   Smoking.   N   Alcohol.   N      Allergy N            Travel_-

      .


FAMILY HIST : 


ROS :


Constitutional: Negative for weight change, chills, night sweats, fatigue and 

usage of assist device. 


  Eyes: Negative for redness, swelling, itching, discharge, vision changes, 

blurry vision, double vision, glaucoma, cataracts, 


  Ears: Negative for hearing loss, ringing, , tinnitus, vertigo


 Nose: Negative for rhinorrhea, stuffiness, sniffing, itching, postnasal drip, 

discoloration, nasal congestion and epistaxis. 


 Throat: Negative for throat clearing, sore throat, hoarseness, difficulty 

swallowing and difficulty speaking. 


  Respiratory: Negative for cough, , sputum production, chest tightness,  

wheezing, pleuritic chest pain ,daytime somnolence, chronic cough, hemoptysis, 

snoring at night,


 Cardiovascular: Negative for chest pain, palpitations, orthopnea, PND, Edema 

of legs, leg cramps, angina, claudication, , irregular heartbeat,


 Neurology: Negative for irritability, muscle weakness, numbness and tingling, 

seizures, tremors, migraines, slurred speech, syncope, memory loss, mood changes

, recurrent headaches 


Gastrointestinal: Negative for difficulty swallowing, diarrhea, constipation, 

black stools, rectal bleeding, nausea, flatulence, reflux, poor appetite, 

changes in bowel habits, abdominal pain


  Genitourinary patient has a frequent urination with decreasing urine output 

no definite dysuria currently or hematuria.


 Psychiatric: Negative for depression, anxiety/panic, suicidal tendencies, 


Musculoskeletal: Negative for swollen joints, back pain, , neck pain, morning 

stiffness of joints, . 


 Skin: Negative for rash, ulcers, itching, dry skin and pigmented lesions.


P/E: 


  Constitutional: Appears stated age and in no apparent distress. 


 Head: Normocephalic. 


  Ears: External ear canals patent without inflammation. Tympanic membranes 

intact with normal light reflex and landmark. 


  Eyes: Pupils are central, bilaterally equal, symmetrical and reacts to light 

with normal movements and no icterus or pallor. 


 Nose: External nares are patent. Mucosa is pink  Mouth-Throat: Good general 

appearance and condition. No post-pharyngeal/oropharyngeal erythema and 

tonsillar hypertrophy. Good dental hygiene. 


  Neck-Lymphatic: Neck is supple with normal ROM, no thyromegaly, lymph nodes 

or masses. JVD is normal with no carotid bruit. 


  Lungs: Clear to percussion and auscultation with bilateral normal air entry. 


  Cardiovascular: S1 and S2 are normal with no murmurs, gallops and rub. 


  GI Exam: No hepatomegaly. Abdomen is soft and non-tender. No Organomegaly , 

masses or hernias are evident and bowel sounds are normal and active. 


  Neurology: Higher function and all cranial nerves intact, with no gross motor 

or sensory deficit. Superficial and deep reflexes are normal with downwards 

planters. No cerebellar deficit with normal gait. 


  Musculoskeletal: No tender spots with normal curvature of the spine with no 

swelling or restricted ROM of the small and large joints. 


  Extremities: Homans sign absent. Intact pulses with no pitting edema, calf 

tenderness or skin color changes.  There is tenderness in the right ankle range 

of motion is restricted because of pain


  Skin: No rash, eruptions or abnormal skin pigmentation





LAB/RADIOLOGY:


ASSESMENT :


Acute on chronic renal failure with hypokalemia.


Hypertension atrial fibrillation.


Type 2 diabetes





PLAN: 


See orders








Present on Admission





- Present on Admission


Any Indicators Present on Admission: No





Past Patient History





- Past Medical History & Family History


Past Medical History?: Yes





- Past Social History


Smoking Status: Former Smoker





- CARDIAC


Hx Atrial Fibrillation: Yes


Hx Hypercholesterolemia: Yes


Hx Hypertension: Yes





- PULMONARY


Hx Respiratory Disorders: No





- NEUROLOGICAL


Hx Neurological Disorder: Yes


Hx Dizziness: Yes


Hx Syncope: Yes





- HEENT


Hx HEENT Problems: No





- RENAL


Hx Chronic Kidney Disease: No





- ENDOCRINE/METABOLIC


Hx Hypothyroidism: Yes





- HEMATOLOGICAL/ONCOLOGICAL


Hx Blood Disorders: No


Hx Blood Transfusions: No





- INTEGUMENTARY


Hx Dermatological Problems: No





- MUSCULOSKELETAL/RHEUMATOLOGICAL


Hx Falls: Yes





- GASTROINTESTINAL


Hx Gastrointestinal Disorders: No





- GENITOURINARY/GYNECOLOGICAL


Hx Genitourinary Disorders: No





- PSYCHIATRIC


Hx Substance Use: No





- SURGICAL HISTORY


Hx Cholecystectomy: Yes


Hx Herniorrhaphy: Yes


Other/Comment: brain tumor removal in [place]





- ANESTHESIA


Hx Anesthesia: Yes


Hx Anesthesia Reactions: No





Meds


Allergies/Adverse Reactions: 


 Allergies











Allergy/AdvReac Type Severity Reaction Status Date / Time


 


No Known Allergies Allergy   Verified 08/06/18 13:09














Results





- Vital Signs


Recent Vital Signs: 





 Last Vital Signs











Temp  98.1 F   08/07/18 07:00


 


Pulse  91 H  08/07/18 07:15


 


Resp  20   08/07/18 07:00


 


BP  113/69   08/07/18 07:00


 


Pulse Ox  99   08/07/18 07:00














- Labs


Result Diagrams: 


 08/07/18 07:02





 08/07/18 07:02


Labs: 





 Laboratory Results - last 24 hr











  08/06/18 08/06/18 08/06/18





  13:11 14:00 14:00


 


WBC   


 


RBC   


 


Hgb   


 


Hct   


 


MCV   


 


MCH   


 


MCHC   


 


RDW   


 


Plt Count   


 


MPV   


 


Neut % (Auto)   


 


Lymph % (Auto)   


 


Mono % (Auto)   


 


Eos % (Auto)   


 


Baso % (Auto)   


 


Neut # (Auto)   


 


Lymph # (Auto)   


 


Mono # (Auto)   


 


Eos # (Auto)   


 


Baso # (Auto)   


 


PT   27.0 H 


 


INR   2.5 


 


APTT   41 H 


 


Sodium    139


 


Potassium    2.9 L


 


Chloride    91 L


 


Carbon Dioxide    32 H


 


Anion Gap    19


 


BUN    106 H* D


 


Creatinine    3.4 H


 


Est GFR ( Amer)    22


 


Est GFR (Non-Af Amer)    18


 


POC Glucose (mg/dL)  155 H  


 


Random Glucose    149 H


 


Uric Acid    15.9 H


 


Calcium    9.4


 


Total Bilirubin    1.1


 


AST    32


 


ALT    31


 


Alkaline Phosphatase    73


 


Troponin I    0.0280


 


NT-Pro-B Natriuret Pep    1250 H


 


Total Protein    8.9 H


 


Albumin    4.4


 


Globulin    4.5 H


 


Albumin/Globulin Ratio    1.0


 


Urine Color   


 


Urine Clarity   


 


Urine pH   


 


Ur Specific Gravity   


 


Urine Protein   


 


Urine Glucose (UA)   


 


Urine Ketones   


 


Urine Blood   


 


Urine Nitrate   


 


Urine Bilirubin   


 


Urine Urobilinogen   


 


Ur Leukocyte Esterase   


 


Urine WBC (Auto)   


 


Urine RBC (Auto)   


 


Ur Squamous Epith Cells   


 


Urine Bacteria   














  08/06/18 08/06/18 08/06/18





  16:45 20:39 22:09


 


WBC   


 


RBC   


 


Hgb   


 


Hct   


 


MCV   


 


MCH   


 


MCHC   


 


RDW   


 


Plt Count   


 


MPV   


 


Neut % (Auto)   


 


Lymph % (Auto)   


 


Mono % (Auto)   


 


Eos % (Auto)   


 


Baso % (Auto)   


 


Neut # (Auto)   


 


Lymph # (Auto)   


 


Mono # (Auto)   


 


Eos # (Auto)   


 


Baso # (Auto)   


 


PT   


 


INR   


 


APTT   


 


Sodium   


 


Potassium   


 


Chloride   


 


Carbon Dioxide   


 


Anion Gap   


 


BUN   


 


Creatinine   


 


Est GFR ( Amer)   


 


Est GFR (Non-Af Amer)   


 


POC Glucose (mg/dL)  144 H  144 H 


 


Random Glucose   


 


Uric Acid   


 


Calcium   


 


Total Bilirubin   


 


AST   


 


ALT   


 


Alkaline Phosphatase   


 


Troponin I   


 


NT-Pro-B Natriuret Pep   


 


Total Protein   


 


Albumin   


 


Globulin   


 


Albumin/Globulin Ratio   


 


Urine Color    Yellow


 


Urine Clarity    Clear


 


Urine pH    5.0


 


Ur Specific Gravity    1.014


 


Urine Protein    2+ H


 


Urine Glucose (UA)    Normal


 


Urine Ketones    Negative


 


Urine Blood    Negative


 


Urine Nitrate    Negative


 


Urine Bilirubin    Negative


 


Urine Urobilinogen    Normal


 


Ur Leukocyte Esterase    Neg


 


Urine WBC (Auto)    2


 


Urine RBC (Auto)    < 1


 


Ur Squamous Epith Cells    < 1


 


Urine Bacteria    Rare














  08/07/18 08/07/18 08/07/18





  06:42 07:02 07:02


 


WBC   6.6 


 


RBC   4.42 


 


Hgb   13.8 


 


Hct   39.8 


 


MCV   90.0 


 


MCH   31.2 H 


 


MCHC   34.6 


 


RDW   12.6 


 


Plt Count   172 


 


MPV   10.8 


 


Neut % (Auto)   63.0 


 


Lymph % (Auto)   22.9 


 


Mono % (Auto)   10.3 H 


 


Eos % (Auto)   2.9 


 


Baso % (Auto)   0.9 


 


Neut # (Auto)   4.2 


 


Lymph # (Auto)   1.5 


 


Mono # (Auto)   0.7 


 


Eos # (Auto)   0.2 


 


Baso # (Auto)   0.1 


 


PT   


 


INR   


 


APTT   


 


Sodium    142


 


Potassium    3.4 L


 


Chloride    95 L


 


Carbon Dioxide    31 H


 


Anion Gap    19


 


BUN    101 H*


 


Creatinine    3.1 H


 


Est GFR ( Amer)    24


 


Est GFR (Non-Af Amer)    20


 


POC Glucose (mg/dL)  129 H  


 


Random Glucose    134 H


 


Uric Acid   


 


Calcium    8.8


 


Total Bilirubin    0.8


 


AST    26


 


ALT    27


 


Alkaline Phosphatase    66


 


Troponin I   


 


NT-Pro-B Natriuret Pep   


 


Total Protein    7.9


 


Albumin    4.2


 


Globulin    3.7


 


Albumin/Globulin Ratio    1.1


 


Urine Color   


 


Urine Clarity   


 


Urine pH   


 


Ur Specific Gravity   


 


Urine Protein   


 


Urine Glucose (UA)   


 


Urine Ketones   


 


Urine Blood   


 


Urine Nitrate   


 


Urine Bilirubin   


 


Urine Urobilinogen   


 


Ur Leukocyte Esterase   


 


Urine WBC (Auto)   


 


Urine RBC (Auto)   


 


Ur Squamous Epith Cells   


 


Urine Bacteria   














  08/07/18





  11:18


 


WBC 


 


RBC 


 


Hgb 


 


Hct 


 


MCV 


 


MCH 


 


MCHC 


 


RDW 


 


Plt Count 


 


MPV 


 


Neut % (Auto) 


 


Lymph % (Auto) 


 


Mono % (Auto) 


 


Eos % (Auto) 


 


Baso % (Auto) 


 


Neut # (Auto) 


 


Lymph # (Auto) 


 


Mono # (Auto) 


 


Eos # (Auto) 


 


Baso # (Auto) 


 


PT 


 


INR 


 


APTT 


 


Sodium 


 


Potassium 


 


Chloride 


 


Carbon Dioxide 


 


Anion Gap 


 


BUN 


 


Creatinine 


 


Est GFR ( Amer) 


 


Est GFR (Non-Af Amer) 


 


POC Glucose (mg/dL)  221 H


 


Random Glucose 


 


Uric Acid 


 


Calcium 


 


Total Bilirubin 


 


AST 


 


ALT 


 


Alkaline Phosphatase 


 


Troponin I 


 


NT-Pro-B Natriuret Pep 


 


Total Protein 


 


Albumin 


 


Globulin 


 


Albumin/Globulin Ratio 


 


Urine Color 


 


Urine Clarity 


 


Urine pH 


 


Ur Specific Gravity 


 


Urine Protein 


 


Urine Glucose (UA) 


 


Urine Ketones 


 


Urine Blood 


 


Urine Nitrate 


 


Urine Bilirubin 


 


Urine Urobilinogen 


 


Ur Leukocyte Esterase 


 


Urine WBC (Auto) 


 


Urine RBC (Auto) 


 


Ur Squamous Epith Cells 


 


Urine Bacteria

## 2018-08-08 LAB
ALBUMIN SERPL-MCNC: 3.9 G/DL (ref 3.5–5)
ALBUMIN/GLOB SERPL: 1.1 {RATIO} (ref 1–2.1)
ALT SERPL-CCNC: 24 U/L (ref 21–72)
AST SERPL-CCNC: 26 U/L (ref 17–59)
BASOPHILS # BLD AUTO: 0 K/UL (ref 0–0.2)
BASOPHILS NFR BLD: 0.9 % (ref 0–2)
BUN SERPL-MCNC: 77 MG/DL (ref 9–20)
CALCIUM SERPL-MCNC: 8.7 MG/DL (ref 8.6–10.4)
EOSINOPHIL # BLD AUTO: 0.1 K/UL (ref 0–0.7)
EOSINOPHIL NFR BLD: 3.1 % (ref 0–4)
ERYTHROCYTE [DISTWIDTH] IN BLOOD BY AUTOMATED COUNT: 12.7 % (ref 11.5–14.5)
GFR NON-AFRICAN AMERICAN: 26
HGB BLD-MCNC: 13.1 G/DL (ref 12–18)
INR PPP: 2.3
LYMPHOCYTES # BLD AUTO: 0.9 K/UL (ref 1–4.3)
LYMPHOCYTES NFR BLD AUTO: 21.8 % (ref 20–40)
MCH RBC QN AUTO: 30.4 PG (ref 27–31)
MCHC RBC AUTO-ENTMCNC: 34 G/DL (ref 33–37)
MCV RBC AUTO: 89.6 FL (ref 80–94)
MONOCYTES # BLD: 0.4 K/UL (ref 0–0.8)
MONOCYTES NFR BLD: 8.9 % (ref 0–10)
NEUTROPHILS # BLD: 2.7 K/UL (ref 1.8–7)
NEUTROPHILS NFR BLD AUTO: 65.3 % (ref 50–75)
NRBC BLD AUTO-RTO: 0 % (ref 0–2)
PLATELET # BLD: 185 K/UL (ref 130–400)
PMV BLD AUTO: 10.7 FL (ref 7.2–11.7)
PROTHROMBIN TIME: 25.5 SECONDS (ref 9.7–12.2)
RBC # BLD AUTO: 4.32 MIL/UL (ref 4.4–5.9)
URATE SERPL-MCNC: 12.8 MG/DL (ref 3.5–8.5)
WBC # BLD AUTO: 4.1 K/UL (ref 4.8–10.8)

## 2018-08-08 RX ADMIN — HUMAN INSULIN SCH: 100 INJECTION, SOLUTION SUBCUTANEOUS at 16:40

## 2018-08-08 RX ADMIN — OXYCODONE HYDROCHLORIDE AND ACETAMINOPHEN PRN TAB: 5; 325 TABLET ORAL at 10:49

## 2018-08-08 RX ADMIN — HUMAN INSULIN SCH: 100 INJECTION, SOLUTION SUBCUTANEOUS at 21:50

## 2018-08-08 RX ADMIN — HUMAN INSULIN SCH: 100 INJECTION, SOLUTION SUBCUTANEOUS at 12:49

## 2018-08-08 RX ADMIN — HUMAN INSULIN SCH: 100 INJECTION, SOLUTION SUBCUTANEOUS at 10:42

## 2018-08-08 NOTE — CP.PCM.PN
Subjective





- Date & Time of Evaluation


Date of Evaluation: 08/08/18


Time of Evaluation: 14:13





- Subjective


Subjective: 





CHIEF COMPLAINTS TODAY :


currently patient has no symptoms





ROS.


HEENT :  N.


Resp :       No cough, wheezing ,pleuritic CP ,or hemoptysis 


Cardio :     No anginal  CP, PND, orthopnea, palpitation 


GI :           No abd.pain, n/v ,diarrhea or GI bleeding .


CNS : No headache, vertigo, focal deficit.


Musculoskel :  No joint swelling , right ankle pain


Derm :        No rash 


Psych :     Normal affect.


Ext :  No  swelling ,calf pain 





PE.


Pt. is alert awake in no distress.


V.S  As noted in the chart 


Head ,ear nose,throat and eyes : Normal.


Neck : Supple with normal carotids.


Lungs: Clear air entry.


Heart : S1 & S2 normal with S4. No murmur.


Abd : Soft non tender with normal bowel sounds.


Neuro : Moves all ext. with no localized deficit.


Ext : No edema with intact pulses.Non tender calves .  Tenderness in the right 

ankle


Derm : No rashes or decubitus ulcer.





LABS/RADIOLOGY:BUN is trending downward to 77 and creatinine  2.5





ASSESSMENT/PLAN : 


continue IV hydration and follow up the renal function in a.m.


Patient's creatinine was 3.5 prior to taking by mouth antibiotics as an 

outpatient.





Objective





- Vital Signs/Intake and Output


Vital Signs (last 24 hours): 


 











Temp Pulse Resp BP Pulse Ox


 


 97.4 F L  69   20   153/87 H  97 


 


 08/08/18 07:00  08/08/18 10:00  08/08/18 07:00  08/08/18 07:00  08/08/18 07:00








Intake and Output: 


 











 08/08/18 08/08/18





 11:59 23:59


 


Intake Total 800 


 


Output Total 200 


 


Balance 600 














- Medications


Medications: 


 Current Medications





Ergocalciferol (Drisdol 50,000 Intl Units Cap)  1 cap PO Q7D [facility]


   Last Admin: 08/08/18 10:51 Dose:  1 cap


Sodium Chloride (Sodium Chloride 0.9%)  1,000 mls @ 100 mls/hr IV .Q10H [facility]


   Last Admin: 08/07/18 21:08 Dose:  Not Given


Insulin Human Regular (Novolin R)  0 unit SC ACHS [facility]


   PRN Reason: Protocol


   Last Admin: 08/08/18 12:49 Dose:  Not Given


Levothyroxine Sodium (Synthroid)  200 mcg PO DAILY@0630 [facility]


   Last Admin: 08/08/18 06:02 Dose:  200 mcg


Oxycodone/Acetaminophen (Percocet 5/325 Mg Tab)  1 tab PO Q6H [facility]


   PRN Reason: Pain, severe (8-10)


   Stop: 08/09/18 21:16


   Last Admin: 08/08/18 10:49 Dose:  1 tab


Sitagliptin Phosphate (Januvia)  25 mg PO DAILY [facility]


Warfarin Sodium (Coumadin)  5 mg PO 1800 [facility]


   Stop: 08/08/18 18:01











- Labs


Labs: 


 





 08/08/18 08:36 





 08/08/18 08:36 





 











PT  25.5 SECONDS (9.7-12.2)  H D 08/08/18  08:36    


 


INR  2.3  D 08/08/18  08:36    


 


APTT  41 SECONDS (21-34)  H  08/06/18  14:00

## 2018-08-08 NOTE — CP.PCM.PN
Subjective





- Date & Time of Evaluation


Date of Evaluation: 08/08/18


Time of Evaluation: 12:11





- Subjective


Subjective: 





Nephrology Consultation Note:





Assessment: Stable


Acute Kidney Injury (N17.9) etiology, likely due to pre--renal state: improving 

with IVF


Hypokalemia, Vit D insuff


Chronic Kidney Disease (N18.3) Stage 3 with 832 mg proteinuria and 423 mg 

albuminuria (R80.9) ? due to DM/obesity


Obesity, A fib on coumadin


Hyperuricemia


Hypothyroidism





Plan


No acute need for renal replacement therapy at this time. 


BP controlled without meds. Maintain hemodynamics stable. Avoid hypotension. 

Patient not on ACEI/ARB due to recent IRIS


Monitor Input/Output, daily weights and renal function with basic metabolic 

panel


continue with IVF as NS


d/c metformin due to serum Cr elevation. consider alternative meds to control 

sugar.  


can add uloric 40 mg/day for hyperuricemia. (deferred allopurniol due to 

interaction with coumadin but uloric is non-formulary)


hold lasix


supplement KDUR as needed


started weekly Vit D





Dose meds/antibiotics for reduced GFR. Avoid fleets enema/magnesium based 

laxatives. Avoid nephrotoxins/NSAIDs/ iodinated contrast (unless needed 

emergently)


Glycemic control


Further work up/management as per primary team





Thanks for allowing me to participate in care of your patient. Will follow 

patient with you. Please call if any Qs. had d/w team and son with pt permission


Dr Tam Luis


Office: [phone] Cell: [phone] Fax: [phone]





Chief Complaint; kidney problem


Reason for consult: Acute Kidney Injury


HPI: Pt is a 68 M with hx of diabetes Mellitus ? years, Hypothyroidism, A fib 

on coumadin, CKD stage 3 with baseline cr 1.5-2.0 since 2016, chronic edema on 

lasix presented with complaints of foot pain and also found to have IRIS hence 

renal consulted. pt or son not aware about kidney disease in past


Denies OTC/herbal meds or NSAIDs


No recent iodinated contrast exposure. No obvious episodes of low BP.


pt reports decreased appetite for last 1-2 days and was recently treated with 

an antibiotics for UTI





ROS: 


Cardiovascular: No chest pain. 


Pulmonary: No shortness of breath 


Gastrointestinal: denies abdominal pain No nausea. No vomiting. 


Genitourinary: No pain while urinating. Denies blood in urine. reports protate 

issues in past


All other negative except as mentioned in HPI





Physical Examination:      


General Appearance: Comfortable, in no acute respiratory distress, co-operative 

. 


Vitals reviewed and noted as below


Head; Atraumatic, normocephalic


ENT: no ulcers no thrush. Tongue is midline. Oropharynx: no rash or ulcers.


EYES: Pupils are equal, round and reactive to light accommodation. Eye muscles 

and extraocular movement intact. Sclera is anicteric.


Neck; supple no lymphadenopathy, no thyromegaly or bruit


Lungs: Normal respiratory rate/effort. Breath sounds bilateral equal and clear


Heart: Normal rate. s1s2 normal. No rub or gallop. 


Extremities: no edema. No varicose veins. chronic hyperpigmented changes in leg 

skin noted


Neurological: Patient is alert, awake and oriented to person, place and time. 

No focal deficit. Strength bilateral appropriate and equal


Skin: Warm and dry. Normal turgor. No rash. Palpitation: Normal elasticity for 

age


Abdomen: Abdomen is soft. Bowel sounds +. There is no abdominal tenderness, no 

guarding/rigidity no organomegaly


Psych: limited insight and normal affect/mood


MSK: no joint tenderness or swelling. Digits and nails normal, no deformity


: kidney or bladder not palpable





Labs/imaging reviewed.


Past medical history, past surgical history, family history, social history, 

allergy reviewed and noted as below


Family hx: no hx of CKD. Rest non-contributory 





renal imaging b/l medical renal disease with increased echogenicity. no 

hydronephrosis.


uric acid 15.9


UA 2+ protein


urine eos neg


FeNa 1%





Objective





- Vital Signs/Intake and Output


Vital Signs (last 24 hours): 


 











Temp Pulse Resp BP Pulse Ox


 


 97.4 F L  78   20   153/87 H  97 


 


 08/08/18 07:00  08/08/18 07:00  08/08/18 07:00  08/08/18 07:00  08/08/18 07:00








Intake and Output: 


 











 08/08/18 08/08/18





 06:59 18:59


 


Intake Total 1600 


 


Output Total 200 


 


Balance 1400 














- Medications


Medications: 


 Current Medications





Ergocalciferol (Drisdol 50,000 Intl Units Cap)  1 cap PO Q7D [facility]


   Last Admin: 08/08/18 10:51 Dose:  1 cap


Sodium Chloride (Sodium Chloride 0.9%)  1,000 mls @ 100 mls/hr IV .Q10H [facility]


   Last Admin: 08/07/18 21:08 Dose:  Not Given


Insulin Human Regular (Novolin R)  0 unit SC ACHS [facility]


   PRN Reason: Protocol


   Last Admin: 08/08/18 10:42 Dose:  Not Given


Levothyroxine Sodium (Synthroid)  200 mcg PO DAILY@0630 [facility]


   Last Admin: 08/08/18 06:02 Dose:  200 mcg


Oxycodone/Acetaminophen (Percocet 5/325 Mg Tab)  1 tab PO Q6H [facility]


   PRN Reason: Pain, severe (8-10)


   Stop: 08/09/18 21:16


   Last Admin: 08/08/18 10:49 Dose:  1 tab











- Labs


Labs: 


 





 08/08/18 08:36 





 08/08/18 08:36 





 











PT  25.5 SECONDS (9.7-12.2)  H D 08/08/18  08:36    


 


INR  2.3  D 08/08/18  08:36    


 


APTT  41 SECONDS (21-34)  H  08/06/18  14:00

## 2018-08-09 LAB
ALBUMIN SERPL-MCNC: 3.6 G/DL (ref 3.5–5)
ALBUMIN/GLOB SERPL: 1 {RATIO} (ref 1–2.1)
ALT SERPL-CCNC: 30 U/L (ref 21–72)
AST SERPL-CCNC: 30 U/L (ref 17–59)
BASOPHILS # BLD AUTO: 0 K/UL (ref 0–0.2)
BASOPHILS NFR BLD: 1.2 % (ref 0–2)
BUN SERPL-MCNC: 58 MG/DL (ref 9–20)
CALCIUM SERPL-MCNC: 8.6 MG/DL (ref 8.6–10.4)
EOSINOPHIL # BLD AUTO: 0.2 K/UL (ref 0–0.7)
EOSINOPHIL NFR BLD: 4.3 % (ref 0–4)
ERYTHROCYTE [DISTWIDTH] IN BLOOD BY AUTOMATED COUNT: 12.6 % (ref 11.5–14.5)
GFR NON-AFRICAN AMERICAN: 31
HGB BLD-MCNC: 12.5 G/DL (ref 12–18)
INR PPP: 2.1
LYMPHOCYTES # BLD AUTO: 1 K/UL (ref 1–4.3)
LYMPHOCYTES NFR BLD AUTO: 26.7 % (ref 20–40)
MCH RBC QN AUTO: 30.9 PG (ref 27–31)
MCHC RBC AUTO-ENTMCNC: 34.4 G/DL (ref 33–37)
MCV RBC AUTO: 90 FL (ref 80–94)
MONOCYTES # BLD: 0.4 K/UL (ref 0–0.8)
MONOCYTES NFR BLD: 10.3 % (ref 0–10)
NEUTROPHILS # BLD: 2.1 K/UL (ref 1.8–7)
NEUTROPHILS NFR BLD AUTO: 57.5 % (ref 50–75)
NRBC BLD AUTO-RTO: 0 % (ref 0–2)
PLATELET # BLD: 180 K/UL (ref 130–400)
PMV BLD AUTO: 10.1 FL (ref 7.2–11.7)
PROTHROMBIN TIME: 22.5 SECONDS (ref 9.7–12.2)
RBC # BLD AUTO: 4.04 MIL/UL (ref 4.4–5.9)
WBC # BLD AUTO: 3.7 K/UL (ref 4.8–10.8)

## 2018-08-09 RX ADMIN — HUMAN INSULIN SCH: 100 INJECTION, SOLUTION SUBCUTANEOUS at 22:13

## 2018-08-09 RX ADMIN — HUMAN INSULIN SCH: 100 INJECTION, SOLUTION SUBCUTANEOUS at 07:27

## 2018-08-09 RX ADMIN — OXYCODONE HYDROCHLORIDE AND ACETAMINOPHEN PRN TAB: 5; 325 TABLET ORAL at 00:57

## 2018-08-09 RX ADMIN — HUMAN INSULIN SCH: 100 INJECTION, SOLUTION SUBCUTANEOUS at 16:40

## 2018-08-09 RX ADMIN — HUMAN INSULIN SCH: 100 INJECTION, SOLUTION SUBCUTANEOUS at 12:47

## 2018-08-09 NOTE — CP.PCM.DIS
Provider





- Provider


Date of Admission: 


08/06/18 15:04





Attending physician: 


Deborah Hood MD





Time Spent in preparation of Discharge (in minutes): 35





Hospital Course





- Lab Results


Lab Results: 


 Micro Results





08/06/18 18:00   Blood-Venous   Blood Culture - Preliminary


                            NO GROWTH AFTER 48 HOURS


08/06/18 18:40   Blood-Venous   Blood Culture - Preliminary


                            NO GROWTH AFTER 48 HOURS


08/06/18 22:09   Urine,Clean Catch   Urine Culture - Final


                            Gram Positive Cocci





 Most Recent Lab Values











WBC  3.7 K/uL (4.8-10.8)  L  08/09/18  07:13    


 


RBC  4.04 Mil/uL (4.40-5.90)  L  08/09/18  07:13    


 


Hgb  12.5 g/dL (12.0-18.0)   08/09/18  07:13    


 


Hct  36.3 % (35.0-51.0)   08/09/18  07:13    


 


MCV  90.0 fL (80.0-94.0)   08/09/18  07:13    


 


MCH  30.9 pg (27.0-31.0)   08/09/18  07:13    


 


MCHC  34.4 g/dL (33.0-37.0)   08/09/18  07:13    


 


RDW  12.6 % (11.5-14.5)   08/09/18  07:13    


 


Plt Count  180 K/uL (130-400)   08/09/18  07:13    


 


MPV  10.1 fL (7.2-11.7)   08/09/18  07:13    


 


Neut % (Auto)  57.5 % (50.0-75.0)   08/09/18  07:13    


 


Lymph % (Auto)  26.7 % (20.0-40.0)   08/09/18  07:13    


 


Mono % (Auto)  10.3 % (0.0-10.0)  H  08/09/18  07:13    


 


Eos % (Auto)  4.3 % (0.0-4.0)  H  08/09/18  07:13    


 


Baso % (Auto)  1.2 % (0.0-2.0)   08/09/18  07:13    


 


Neut # (Auto)  2.1 K/uL (1.8-7.0)   08/09/18  07:13    


 


Lymph # (Auto)  1.0 K/uL (1.0-4.3)   08/09/18  07:13    


 


Mono # (Auto)  0.4 K/uL (0.0-0.8)   08/09/18  07:13    


 


Eos # (Auto)  0.2 K/uL (0.0-0.7)   08/09/18  07:13    


 


Baso # (Auto)  0.0 K/uL (0.0-0.2)   08/09/18  07:13    


 


PT  22.5 SECONDS (9.7-12.2)  H  08/09/18  07:13    


 


INR  2.1   08/09/18  07:13    


 


APTT  41 SECONDS (21-34)  H  08/06/18  14:00    


 


Sodium  145 mmol/L (132-148)   08/09/18  07:13    


 


Potassium  3.9 mmol/L (3.6-5.2)   08/09/18  07:13    


 


Chloride  107 mmol/L ()   08/09/18  07:13    


 


Carbon Dioxide  27 mmol/L (22-30)   08/09/18  07:13    


 


Anion Gap  15  (10-20)   08/09/18  07:13    


 


BUN  58 mg/dL (9-20)  H  08/09/18  07:13    


 


Creatinine  2.1 mg/dL (0.8-1.5)  H  08/09/18  07:13    


 


Est GFR ( Amer)  38   08/09/18  07:13    


 


Est GFR (Non-Af Amer)  31   08/09/18  07:13    


 


POC Glucose (mg/dL)  149 mg/dL ()  H  08/09/18  11:29    


 


Random Glucose  122 mg/dL ()  H  08/09/18  07:13    


 


Hemoglobin A1c  7.4 % (4.2-6.5)  H  08/08/18  08:36    


 


Uric Acid  12.8 mg/dL (3.5-8.5)  H  08/08/18  08:36    


 


Calcium  8.6 mg/dl (8.6-10.4)   08/09/18  07:13    


 


Phosphorus  2.9 mg/dL (2.5-4.5)   08/08/18  08:36    


 


Total Bilirubin  0.5 mg/dL (0.2-1.3)   08/09/18  07:13    


 


AST  30 U/L (17-59)   08/09/18  07:13    


 


ALT  30 U/L (21-72)   08/09/18  07:13    


 


Alkaline Phosphatase  55 U/L ()   08/09/18  07:13    


 


Total Creatine Kinase  104 U/L ()   08/08/18  08:36    


 


Troponin I  0.0280 ng/mL (0.00-0.120)   08/06/18  14:00    


 


NT-Pro-B Natriuret Pep  1250 pg/mL (0-900)  H  08/06/18  14:00    


 


Total Protein  7.1 g/dL (6.3-8.3)   08/09/18  07:13    


 


Albumin  3.6 g/dL (3.5-5.0)   08/09/18  07:13    


 


Globulin  3.5 gm/dL (2.2-3.9)   08/09/18  07:13    


 


Albumin/Globulin Ratio  1.0  (1.0-2.1)   08/09/18  07:13    


 


25-OH Vitamin D Total  22.0 NG/ML (30.0-100.0)  L  08/08/18  08:36    


 


PTH Intact Whole Molec  310 pg/mL (14-64)  H  08/07/18  14:14    


 


Urine Color  Yellow  (YELLOW)   08/07/18  15:01    


 


Urine Clarity  Clear  (Clear)   08/07/18  15:01    


 


Urine pH  6.0  (5.0-8.0)   08/07/18  15:01    


 


Ur Specific Gravity  1.013  (1.003-1.030)   08/07/18  15:01    


 


Urine Protein  2+ mg/dL (NEGATIVE)  H  08/07/18  15:01    


 


Urine Glucose (UA)  Normal mg/dL (Normal)   08/07/18  15:01    


 


Urine Ketones  Negative mg/dL (NEGATIVE)   08/07/18  15:01    


 


Urine Blood  Negative  (NEGATIVE)   08/07/18  15:01    


 


Urine Nitrate  Negative  (NEGATIVE)   08/07/18  15:01    


 


Urine Bilirubin  Negative  (NEGATIVE)   08/07/18  15:01    


 


Urine Urobilinogen  Normal mg/dL (0.2-1.0)   08/07/18  15:01    


 


Ur Leukocyte Esterase  Neg Adela/uL (Negative)   08/07/18  15:01    


 


Urine WBC (Auto)  1 /hpf (0-5)   08/07/18  15:01    


 


Urine RBC (Auto)  < 1 /hpf (0-3)   08/07/18  15:01    


 


Ur Squamous Epith Cells  1 /hpf (0-5)   08/07/18  15:01    


 


Urine Bacteria  Rare  (<OCC)   08/06/18  22:09    


 


Urine Eosinophils  Negative  (NEGATIVE)   08/07/18  18:24    


 


Ur Random Creatinine  102 mg/dL ()   08/07/18  15:01    


 


U Random Total Protein  832 mg/g creat ()  H  08/07/18  15:01    


 


Ur Random Sodium  38 mmol/L  08/07/18  15:01    


 


Urine Total Volume  43.0 mg/dL  08/07/18  15:01    


 


Microalb/Creat Ratio  423   (<30)  H  08/07/18  15:01    














- Hospital Course


Hospital Course: 





Patient is admitted for progressive renal failure and decreasing urine output 

and right ankle pain.


Patient has a history of hypertension and atrial fibrillation history of 

diastolic heart failure type 2 diabetes recently patients creatinine had gone 

up to 1.5-1.6.  [place] and came back after 1 

month and a repeat blood test showed creatinine had gone up to 3.4.  Patient 

was also complaining of urinary symptoms and outpatient urine culture were 

positive for E. coli more than 100,000.  Patient was given Augmentin which was 

sensitive to E. coli.  Patient took some antibiotics and started complaining of 

more decreasing urine output and swelling and pain on the right ankle patient 

stated that the pain in the right ankle has been present for the last few 

months.  In the emergency room patients creatinine was 3.5 and BUN was 101 

with a potassium of 2.9.  Patient is admitted now for further evaluation.





patient was admitted on telemetry.  Potassium was corrected by by mouth KCl 

patient was given IV fluid.  BUN trended downwards to the time of discharge the 

BUN was 51 and creatinine 2.5 nephrology consult was obtained workup showed 

chronic renal kidney disease probably secondary to diabetes hypertension.


Eosinophils in the urine were negative to rule out antibiotic associated renal 

injury.


Patient had high blood pressure which was managed with losartan.  Metformin was 

discontinued and Januvia was started.


Patient currently stable we will discharge him and followed outpatient.





Discharge Plan





- Follow Up Plan


Condition: GOOD


Disposition: HOME/ ROUTINE

## 2018-08-09 NOTE — CP.PCM.PN
Subjective





- Date & Time of Evaluation


Date of Evaluation: 08/09/18


Time of Evaluation: 17:16





- Subjective


Subjective: 





Nephrology Consultation Note:





Assessment: Stable


Acute Kidney Injury (N17.9) etiology, likely due to pre--renal state: improving 

with IVF


Hypokalemia, Vit D insuff


Chronic Kidney Disease (N18.3) Stage 3 with 832 mg proteinuria and 423 mg 

albuminuria (R80.9) ? due to DM/obesity


Obesity, A fib on coumadin


Hyperuricemia


Hypothyroidism





Plan


No acute need for renal replacement therapy at this time. 


BP controlled without meds. Maintain hemodynamics stable. Avoid hypotension. 

Patient not on ACEI/ARB due to recent IRIS now better. pt with macoralbuminuria 

hence added losartan


Monitor Input/Output, daily weights and renal function with basic metabolic 

panel


d/c IVF


avoid metformin due to serum Cr elevation. consider alternative meds to control 

sugar.  


can add uloric 40 mg/day for hyperuricemia. (deferred allopurniol due to 

interaction with coumadin but uloric is non-formulary)


hold lasix for now


supplement KDUR as needed


started weekly Vit D





Dose meds/antibiotics for reduced GFR. Avoid fleets enema/magnesium based 

laxatives. Avoid nephrotoxins/NSAIDs/ iodinated contrast (unless needed 

emergently)


Glycemic control


Further work up/management as per primary team





Thanks for allowing me to participate in care of your patient. Will follow 

patient with you. Please call if any Qs. had d/w team and son with pt permission


Dr Tam Luis


Office: [phone] Cell: [phone] Fax: [phone]





Chief Complaint; kidney problem


Reason for consult: Acute Kidney Injury


HPI: Pt is a 68 M with hx of diabetes Mellitus ? years, Hypothyroidism, A fib 

on coumadin, CKD stage 3 with baseline cr 1.5-2.0 since 2016, chronic edema on 

lasix presented with complaints of foot pain and also found to have IRIS hence 

renal consulted. pt or son not aware about kidney disease in past


Denies OTC/herbal meds or NSAIDs


No recent iodinated contrast exposure. No obvious episodes of low BP.


pt reports decreased appetite for last 1-2 days and was recently treated with 

an antibiotics for UTI





ROS: 


Cardiovascular: No chest pain. 


Pulmonary: No shortness of breath 


Gastrointestinal: denies abdominal pain No nausea. No vomiting. 


Genitourinary: No pain while urinating. Denies blood in urine. reports protate 

issues in past


All other negative except as mentioned in HPI





Physical Examination:      


General Appearance: Comfortable, in no acute respiratory distress, co-operative 

. 


Vitals reviewed and noted as below


Head; Atraumatic, normocephalic


ENT: no ulcers no thrush. Tongue is midline. Oropharynx: no rash or ulcers.


EYES: Pupils are equal, round and reactive to light accommodation. Eye muscles 

and extraocular movement intact. Sclera is anicteric.


Neck; supple no lymphadenopathy, no thyromegaly or bruit


Lungs: Normal respiratory rate/effort. Breath sounds bilateral equal and clear


Heart: Normal rate. s1s2 normal. No rub or gallop. 


Extremities: no edema. No varicose veins. chronic hyperpigmented changes in leg 

skin noted


Neurological: Patient is alert, awake and oriented to person, place and time. 

No focal deficit. Strength bilateral appropriate and equal


Skin: Warm and dry. Normal turgor. No rash. Palpitation: Normal elasticity for 

age


Abdomen: Abdomen is soft. Bowel sounds +. There is no abdominal tenderness, no 

guarding/rigidity no organomegaly


Psych: limited insight and normal affect/mood


MSK: no joint tenderness or swelling. Digits and nails normal, no deformity


: kidney or bladder not palpable





Labs/imaging reviewed.


Past medical history, past surgical history, family history, social history, 

allergy reviewed and noted as below


Family hx: no hx of CKD. Rest non-contributory 





renal imaging b/l medical renal disease with increased echogenicity. no 

hydronephrosis.


uric acid 15.9


UA 2+ protein


urine eos neg


FeNa 1%








Objective





- Vital Signs/Intake and Output


Vital Signs (last 24 hours): 


 











Temp Pulse Resp BP Pulse Ox


 


 97.9 F   86   20   148/67   99 


 


 08/09/18 15:59  08/09/18 16:29  08/09/18 15:59  08/09/18 15:59  08/09/18 15:59








Intake and Output: 


 











 08/09/18 08/09/18





 06:59 18:59


 


Intake Total 1440 960


 


Output Total 1000 


 


Balance 440 960














- Medications


Medications: 


 Current Medications





Ergocalciferol (Drisdol 50,000 Intl Units Cap)  1 cap PO Q7D [facility]


   Last Admin: 08/08/18 10:51 Dose:  1 cap


Sodium Chloride (Sodium Chloride 0.9%)  1,000 mls @ 100 mls/hr IV .[facility]10H [facility]


   Last Admin: 08/09/18 17:07 Dose:  100 mls/hr


Insulin Human Regular (Novolin R)  0 unit SC ACHS [facility]


   PRN Reason: Protocol


   Last Admin: 08/09/18 16:40 Dose:  Not Given


Levothyroxine Sodium (Synthroid)  200 mcg PO DAILY@0630 [facility]


   Last Admin: 08/09/18 06:24 Dose:  200 mcg


Losartan Potassium (Cozaar)  50 mg PO DAILY [facility]


   Last Admin: 08/09/18 10:25 Dose:  50 mg


Oxycodone/Acetaminophen (Percocet 5/325 Mg Tab)  1 tab PO Q6H PRN


   PRN Reason: Pain, severe (8-10)


   Stop: 08/09/18 21:16


   Last Admin: 08/09/18 00:57 Dose:  1 tab


Sitagliptin Phosphate (Januvia)  25 mg PO DAILY [facility]


   Last Admin: 08/09/18 10:07 Dose:  25 mg


Warfarin Sodium (Coumadin)  7.5 mg PO 1800 [facility]


   Stop: 08/09/18 18:01











- Labs


Labs: 


 





 08/09/18 07:13 





 08/09/18 07:13 





 











PT  22.5 SECONDS (9.7-12.2)  H  08/09/18  07:13    


 


INR  2.1   08/09/18  07:13    


 


APTT  41 SECONDS (21-34)  H  08/06/18  14:00

## 2018-08-10 VITALS — DIASTOLIC BLOOD PRESSURE: 107 MMHG | OXYGEN SATURATION: 99 % | SYSTOLIC BLOOD PRESSURE: 169 MMHG | TEMPERATURE: 98.3 F

## 2018-08-10 VITALS — HEART RATE: 89 BPM

## 2018-08-10 LAB
ALBUMIN SERPL-MCNC: 4.1 G/DL (ref 3.5–5)
ALBUMIN/GLOB SERPL: 1.1 {RATIO} (ref 1–2.1)
ALT SERPL-CCNC: 28 U/L (ref 21–72)
AST SERPL-CCNC: 30 U/L (ref 17–59)
BASOPHILS # BLD AUTO: 0 K/UL (ref 0–0.2)
BASOPHILS NFR BLD: 0.8 % (ref 0–2)
BUN SERPL-MCNC: 42 MG/DL (ref 9–20)
CALCIUM SERPL-MCNC: 8.8 MG/DL (ref 8.6–10.4)
EOSINOPHIL # BLD AUTO: 0.1 K/UL (ref 0–0.7)
EOSINOPHIL NFR BLD: 3.2 % (ref 0–4)
ERYTHROCYTE [DISTWIDTH] IN BLOOD BY AUTOMATED COUNT: 12.5 % (ref 11.5–14.5)
GFR NON-AFRICAN AMERICAN: 33
HGB BLD-MCNC: 13.5 G/DL (ref 12–18)
INR PPP: 1.8
LYMPHOCYTES # BLD AUTO: 0.9 K/UL (ref 1–4.3)
LYMPHOCYTES NFR BLD AUTO: 19.6 % (ref 20–40)
MCH RBC QN AUTO: 30.7 PG (ref 27–31)
MCHC RBC AUTO-ENTMCNC: 34.2 G/DL (ref 33–37)
MCV RBC AUTO: 89.6 FL (ref 80–94)
MONOCYTES # BLD: 0.4 K/UL (ref 0–0.8)
MONOCYTES NFR BLD: 7.8 % (ref 0–10)
NEUTROPHILS # BLD: 3.2 K/UL (ref 1.8–7)
NEUTROPHILS NFR BLD AUTO: 68.6 % (ref 50–75)
NRBC BLD AUTO-RTO: 0.1 % (ref 0–2)
PLATELET # BLD: 207 K/UL (ref 130–400)
PMV BLD AUTO: 10.3 FL (ref 7.2–11.7)
PROTHROMBIN TIME: 19.5 SECONDS (ref 9.7–12.2)
RBC # BLD AUTO: 4.4 MIL/UL (ref 4.4–5.9)
WBC # BLD AUTO: 4.6 K/UL (ref 4.8–10.8)

## 2018-08-10 RX ADMIN — HUMAN INSULIN SCH: 100 INJECTION, SOLUTION SUBCUTANEOUS at 07:58

## 2018-08-10 RX ADMIN — HUMAN INSULIN SCH: 100 INJECTION, SOLUTION SUBCUTANEOUS at 12:12

## 2018-08-10 NOTE — CP.PCM.PN
Subjective





- Date & Time of Evaluation


Date of Evaluation: 08/10/18


Time of Evaluation: 12:04





- Subjective


Subjective: 


PT CLEARED FOR D/C PER DR. ROMAN.  ALL RX AND HOME MEDICATIONS REVIEWED WITH 

DR. ROMAN. PT TO GO TO DR. ROMAN'S OFFICE TOMORROW MORNING FOR CMP AND PT/

INR.  D/C INSTRUCTIONS, NEW RX, AND FOLLOW UP  DISCUSSED WITH PT AT LENGTH. ALL 

CONCERNS AND QUESTIONS DISCUSSED.  NO FURTHER ORDERS. 








- SEGUIMIENTO CON EL DR. ROMAN EN LA OFICINA EN EL PLAZO DE 5-7 LUNA --- LLAME 

A LA OFICINA PARA HACER MICHELE DAMIÁN.


- SEGUIMIENTO CON EL DR. MCMAHON O DR. FRANKLIN (MDIC[facility] DE [facility]) EN LA OFICINA EN 

EL PLAZO DE 7-10 LUNA --- LLAME A LA OFICINA PARA HACER MICHELE DAMIÁN.


ASEGRESE DE QUE SOLER TRABAJO DE DELFINA SE HIZO MAANA ([place], 11/8/18) EN LA 

OFICINA DEL DR. ROMAN.





- WANDA MEDICAMENTOS EN CASA ESTN CAMBIANDO. STOP TOMANDO: FUROSEMIDA (LASIX), 

SUPLEMENTO DE POTASIO, METFORMINA (GLUCOPHAGE), VALSARTAN (DIOVAN).


-CONTINE TOMANDO SOLER WARFARIN (COUMADIN) DOSIS CASERA Y LEVOTHYROXINE.


-NUEVAS RECETAS QUE FREEMAN SIDO ENVIADAS A SOLER FARMACIA:


1) AMLODIPINA 5 MG (ESTO ES PARA SOLER PRESIN ARTERIAL) --- TOME 1 TABLETA POR 

BOCA MICHELE VEZ AL DA.


2) VALSARTAN (DIOVAN) 160 MG (ESTO ES PARA SOLER PRESIN ARTERIAL) --- TOME 1 

TABLETA POR BOCA MICHELE VEZ AL DA.


3) JANUVIA 25 MG (ESTO ES PARA SOLER AZCAR) --- TOME 1 TABLETA POR LA BOCA MICHELE 

VEZ AL DA ANTES DEL DESAYUNO.


4) VITAMINA D (DRISDOL) --- YESSICA 1 CPSULA POR BOCA MICHELE VEZ AL DA.





-SI TIENE ALGUNA PREGUNTA O PREOCUPACIN, CONTACTE AL DR. SHANNAN LUX.





________________________________________________________________________________

___________________________________________________________________________


-FOLLOW UP WITH DR. ROMAN IN THE OFFICE WITHIN 5-7 DAYS---CALL THE OFFICE TO 

MAKE AN APPOINTMENT.


-FOLLOW UP WITH DR. MCMAHON OR DR. FRANKLIN ([facility]) IN THE OFFICE WITHIN 7-

10 DAYS---CALL THE OFFICE TO MAKE AN APPOINTMENT.


-MAKE SURE YOU HAVE YOUR BLOOD WORK DRAWN TOMORROW (SATURDAY, 8/11/18) IN THE 

OFFICE OF DR. ROMAN.





-YOUR HOME MEDICATIONS ARE CHANGING.  STOP TAKING: FUROSEMIDE (LASIX), 

POTASSIUM SUPPLEMENT, METFORMIN (GLUCOPHAGE), VALSARTAN (DIOVAN).


-CONTINUE TAKING YOUR WARFARIN (COUMADIN) HOME DOSE AND LEVOTHYROXINE.


-NEW PRESCRIPTIONS THAT HAVE BEEN SENT TO YOUR PHARMACY:


 1) AMLODIPINE 5 MG (THIS IS FOR YOUR BLOOD PRESSURE)---TAKE 1 TABLET BY MOUTH 

ONCE A DAY.


 2) VALSARTAN (DIOVAN) 160 MG (THIS IS FOR YOUR BLOOD PRESSURE)---TAKE 1 TABLET 

BY MOUTH ONCE A DAY.


 3) JANUVIA 25 MG (THIS IS FOR YOUR SUGAR)---TAKE 1 TABLET BY MOUTH ONCE A DAY 

BEFORE BREAKFAST. 


 4) VITAMIN D (DRISDOL)---TAKE 1 CAPSULE BY MOUTH ONCE A DAY.





-IF YOU HAVE ANY QUESTIONS OR CONCERNS, CONTACT DR. ROMAN'S OFFICE.





Objective





- Vital Signs/Intake and Output


Vital Signs (last 24 hours): 


 











Temp Pulse Resp BP Pulse Ox


 


 98.3 F   85   20   169/107 H  99 


 


 08/10/18 07:00  08/10/18 07:00  08/10/18 07:00  08/10/18 07:00  08/10/18 07:00








Intake and Output: 


 











 08/10/18 08/10/18





 06:59 18:59


 


Output Total 700 


 


Balance -700 














- Medications


Medications: 


 Current Medications





Ergocalciferol (Drisdol 50,000 Intl Units Cap)  1 cap PO Q7D [facility]


   Last Admin: 08/08/18 10:51 Dose:  1 cap


Sodium Chloride (Sodium Chloride 0.9%)  1,000 mls @ 100 mls/hr IV .Q10H [facility]


   Last Admin: 08/10/18 03:00 Dose:  100 mls/hr


Insulin Human Regular (Novolin R)  0 unit SC ACHS [facility]


   PRN Reason: Protocol


   Last Admin: 08/10/18 07:58 Dose:  Not Given


Levothyroxine Sodium (Synthroid)  200 mcg PO DAILY@0630 [facility]


   Last Admin: 08/10/18 06:50 Dose:  200 mcg


Losartan Potassium (Cozaar)  100 mg PO DAILY [facility]


Sitagliptin Phosphate (Januvia)  25 mg PO DAILY [facility]


   Last Admin: 08/10/18 09:23 Dose:  25 mg











- Labs


Labs: 


 





 08/10/18 08:37 





 08/10/18 08:37 





 











PT  19.5 SECONDS (9.7-12.2)  H  08/10/18  08:37    


 


INR  1.8   08/10/18  08:37    


 


APTT  41 SECONDS (21-34)  H  08/06/18  14:00

## 2018-08-10 NOTE — CP.PCM.PN
Subjective





- Date & Time of Evaluation


Date of Evaluation: 08/10/18


Time of Evaluation: 13:41





- Subjective


Subjective: 





Nephrology Consultation Note:





Assessment: Stable


Acute Kidney Injury (N17.9) etiology, likely due to pre--renal state: improving 

with IVF


Hypokalemia, Vit D insuff


Chronic Kidney Disease (N18.3) Stage 3 with 832 mg proteinuria and 423 mg 

albuminuria (R80.9) ? due to DM/obesity


Obesity, A fib on coumadin


Hyperuricemia


Hypothyroidism





Plan


No acute need for renal replacement therapy at this time. 


BP controlled without meds. Maintain hemodynamics stable. Avoid hypotension. 

Patient not on ACEI/ARB due to recent IRIS now better. pt with macoralbuminuria 

hence increased losartan 100 mg


Monitor Input/Output, daily weights and renal function with basic metabolic 

panel


d/c IVF


avoid metformin due to serum Cr elevation. consider alternative meds to control 

sugar.  


can add uloric 40 mg/day for hyperuricemia. (deferred allopurniol due to 

interaction with coumadin but uloric is non-formulary)


can resume lasix 


supplement KDUR as needed


started weekly Vit D





Dose meds/antibiotics for reduced GFR. Avoid fleets enema/magnesium based 

laxatives. Avoid nephrotoxins/NSAIDs/ iodinated contrast (unless needed 

emergently)


Glycemic control


Further work up/management as per primary team


pt stable for d/c from renal perspective when planned, 1 week renal follow up 

recommended to pt





Thanks for allowing me to participate in care of your patient. Will follow 

patient with you. Please call if any Qs. had d/w team and son with pt permission


Dr Tam Luis


Office: [phone] Cell: [phone] Fax: [phone]





Chief Complaint; kidney problem


Reason for consult: Acute Kidney Injury


HPI: Pt is a 68 M with hx of diabetes Mellitus ? years, Hypothyroidism, A fib 

on coumadin, CKD stage 3 with baseline cr 1.5-2.0 since 2016, chronic edema on 

lasix presented with complaints of foot pain and also found to have IRIS hence 

renal consulted. pt or son not aware about kidney disease in past


Denies OTC/herbal meds or NSAIDs


No recent iodinated contrast exposure. No obvious episodes of low BP.


pt reports decreased appetite for last 1-2 days and was recently treated with 

an antibiotics for UTI





ROS: 


Cardiovascular: No chest pain. 


Pulmonary: No shortness of breath 


Gastrointestinal: denies abdominal pain No nausea. No vomiting. 


Genitourinary: No pain while urinating. Denies blood in urine. reports protate 

issues in past


All other negative except as mentioned in HPI. c/o leg swelling today





Physical Examination:      


General Appearance: Comfortable, in no acute respiratory distress, co-operative 

. 


Vitals reviewed and noted as below


Head; Atraumatic, normocephalic


ENT: no ulcers no thrush. Tongue is midline. Oropharynx: no rash or ulcers.


EYES: Pupils are equal, round and reactive to light accommodation. Eye muscles 

and extraocular movement intact. Sclera is anicteric.


Neck; supple no lymphadenopathy, no thyromegaly or bruit


Lungs: Normal respiratory rate/effort. Breath sounds bilateral equal and clear


Heart: Normal rate. s1s2 normal. No rub or gallop. 


Extremities: 1+ edema. No varicose veins. chronic hyperpigmented changes in leg 

skin noted


Neurological: Patient is alert, awake and oriented to person, place and time. 

No focal deficit. Strength bilateral appropriate and equal


Skin: Warm and dry. Normal turgor. No rash. Palpitation: Normal elasticity for 

age


Abdomen: Abdomen is soft. Bowel sounds +. There is no abdominal tenderness, no 

guarding/rigidity no organomegaly


Psych: limited insight and normal affect/mood


MSK: no joint tenderness or swelling. Digits and nails normal, no deformity


: kidney or bladder not palpable





Labs/imaging reviewed.


Past medical history, past surgical history, family history, social history, 

allergy reviewed and noted as below


Family hx: no hx of CKD. Rest non-contributory 





renal imaging b/l medical renal disease with increased echogenicity. no 

hydronephrosis.


uric acid 15.9


UA 2+ protein


urine eos neg


FeNa 1%








Objective





- Vital Signs/Intake and Output


Vital Signs (last 24 hours): 


 











Temp Pulse Resp BP Pulse Ox


 


 98.3 F   85   20   169/107 H  99 


 


 08/10/18 07:00  08/10/18 07:00  08/10/18 07:00  08/10/18 07:00  08/10/18 07:00








Intake and Output: 


 











 08/10/18 08/10/18





 06:59 18:59


 


Output Total 700 


 


Balance -700 














- Labs


Labs: 


 





 08/10/18 08:37 





 08/10/18 08:37 





 











PT  19.5 SECONDS (9.7-12.2)  H  08/10/18  08:37    


 


INR  1.8   08/10/18  08:37    


 


APTT  41 SECONDS (21-34)  H  08/06/18  14:00

## 2018-08-10 NOTE — CP.PCM.DIS
Provider





- Provider


Date of Admission: 


08/06/18 15:04





Attending physician: 


Deborah Hood MD





Time Spent in preparation of Discharge (in minutes): 35





Hospital Course





- Lab Results


Lab Results: 


 Micro Results





08/06/18 18:00   Blood-Venous   Blood Culture - Preliminary


                            NO GROWTH AFTER 3 DAYS


08/06/18 18:40   Blood-Venous   Blood Culture - Preliminary


                            NO GROWTH AFTER 3 DAYS


08/06/18 22:09   Urine,Clean Catch   Urine Culture - Final


                            Gram Positive Cocci





 Most Recent Lab Values











WBC  4.6 K/uL (4.8-10.8)  L  08/10/18  08:37    


 


RBC  4.40 Mil/uL (4.40-5.90)   08/10/18  08:37    


 


Hgb  13.5 g/dL (12.0-18.0)   08/10/18  08:37    


 


Hct  39.5 % (35.0-51.0)   08/10/18  08:37    


 


MCV  89.6 fL (80.0-94.0)   08/10/18  08:37    


 


MCH  30.7 pg (27.0-31.0)   08/10/18  08:37    


 


MCHC  34.2 g/dL (33.0-37.0)   08/10/18  08:37    


 


RDW  12.5 % (11.5-14.5)   08/10/18  08:37    


 


Plt Count  207 K/uL (130-400)   08/10/18  08:37    


 


MPV  10.3 fL (7.2-11.7)   08/10/18  08:37    


 


Neut % (Auto)  68.6 % (50.0-75.0)   08/10/18  08:37    


 


Lymph % (Auto)  19.6 % (20.0-40.0)  L  08/10/18  08:37    


 


Mono % (Auto)  7.8 % (0.0-10.0)   08/10/18  08:37    


 


Eos % (Auto)  3.2 % (0.0-4.0)   08/10/18  08:37    


 


Baso % (Auto)  0.8 % (0.0-2.0)   08/10/18  08:37    


 


Neut # (Auto)  3.2 K/uL (1.8-7.0)   08/10/18  08:37    


 


Lymph # (Auto)  0.9 K/uL (1.0-4.3)  L  08/10/18  08:37    


 


Mono # (Auto)  0.4 K/uL (0.0-0.8)   08/10/18  08:37    


 


Eos # (Auto)  0.1 K/uL (0.0-0.7)   08/10/18  08:37    


 


Baso # (Auto)  0.0 K/uL (0.0-0.2)   08/10/18  08:37    


 


PT  19.5 SECONDS (9.7-12.2)  H  08/10/18  08:37    


 


INR  1.8   08/10/18  08:37    


 


APTT  41 SECONDS (21-34)  H  08/06/18  14:00    


 


Sodium  147 mmol/L (132-148)   08/10/18  08:37    


 


Potassium  3.9 mmol/L (3.6-5.2)   08/10/18  08:37    


 


Chloride  106 mmol/L ()   08/10/18  08:37    


 


Carbon Dioxide  26 mmol/L (22-30)   08/10/18  08:37    


 


Anion Gap  19  (10-20)   08/10/18  08:37    


 


BUN  42 mg/dL (9-20)  H  08/10/18  08:37    


 


Creatinine  2.0 mg/dL (0.8-1.5)  H  08/10/18  08:37    


 


Est GFR ( Amer)  40   08/10/18  08:37    


 


Est GFR (Non-Af Amer)  33   08/10/18  08:37    


 


POC Glucose (mg/dL)  145 mg/dL ()  H  08/10/18  11:12    


 


Random Glucose  127 mg/dL ()  H  08/10/18  08:37    


 


Hemoglobin A1c  7.4 % (4.2-6.5)  H  08/08/18  08:36    


 


Uric Acid  12.8 mg/dL (3.5-8.5)  H  08/08/18  08:36    


 


Calcium  8.8 mg/dl (8.6-10.4)   08/10/18  08:37    


 


Phosphorus  2.9 mg/dL (2.5-4.5)   08/08/18  08:36    


 


Total Bilirubin  0.6 mg/dL (0.2-1.3)   08/10/18  08:37    


 


AST  30 U/L (17-59)   08/10/18  08:37    


 


ALT  28 U/L (21-72)   08/10/18  08:37    


 


Alkaline Phosphatase  62 U/L ()   08/10/18  08:37    


 


Total Creatine Kinase  104 U/L ()   08/08/18  08:36    


 


Troponin I  0.0280 ng/mL (0.00-0.120)   08/06/18  14:00    


 


NT-Pro-B Natriuret Pep  1250 pg/mL (0-900)  H  08/06/18  14:00    


 


Total Protein  8.0 g/dL (6.3-8.3)   08/10/18  08:37    


 


Albumin  4.1 g/dL (3.5-5.0)   08/10/18  08:37    


 


Globulin  3.8 gm/dL (2.2-3.9)   08/10/18  08:37    


 


Albumin/Globulin Ratio  1.1  (1.0-2.1)   08/10/18  08:37    


 


25-OH Vitamin D Total  22.0 NG/ML (30.0-100.0)  L  08/08/18  08:36    


 


PTH Intact Whole Molec  310 pg/mL (14-64)  H  08/07/18  14:14    


 


Urine Color  Yellow  (YELLOW)   08/07/18  15:01    


 


Urine Clarity  Clear  (Clear)   08/07/18  15:01    


 


Urine pH  6.0  (5.0-8.0)   08/07/18  15:01    


 


Ur Specific Gravity  1.013  (1.003-1.030)   08/07/18  15:01    


 


Urine Protein  2+ mg/dL (NEGATIVE)  H  08/07/18  15:01    


 


Urine Glucose (UA)  Normal mg/dL (Normal)   08/07/18  15:01    


 


Urine Ketones  Negative mg/dL (NEGATIVE)   08/07/18  15:01    


 


Urine Blood  Negative  (NEGATIVE)   08/07/18  15:01    


 


Urine Nitrate  Negative  (NEGATIVE)   08/07/18  15:01    


 


Urine Bilirubin  Negative  (NEGATIVE)   08/07/18  15:01    


 


Urine Urobilinogen  Normal mg/dL (0.2-1.0)   08/07/18  15:01    


 


Ur Leukocyte Esterase  Neg Adela/uL (Negative)   08/07/18  15:01    


 


Urine WBC (Auto)  1 /hpf (0-5)   08/07/18  15:01    


 


Urine RBC (Auto)  < 1 /hpf (0-3)   08/07/18  15:01    


 


Ur Squamous Epith Cells  1 /hpf (0-5)   08/07/18  15:01    


 


Urine Bacteria  Rare  (<OCC)   08/06/18  22:09    


 


Urine Eosinophils  Negative  (NEGATIVE)   08/07/18  18:24    


 


Ur Random Creatinine  102 mg/dL ()   08/07/18  15:01    


 


U Random Total Protein  832 mg/g creat ()  H  08/07/18  15:01    


 


Ur Random Sodium  38 mmol/L  08/07/18  15:01    


 


Urine Total Volume  43.0 mg/dL  08/07/18  15:01    


 


Microalb/Creat Ratio  423   (<30)  H  08/07/18  15:01    














- Hospital Course


Hospital Course: 





Patient is admitted for progressive renal failure and decreasing urine output 

and right ankle pain.


Patient has a history of hypertension and atrial fibrillation history of 

diastolic heart failure type 2 diabetes recently patients creatinine had gone 

up to 1.5-1.6.  [place] and came back after 1 

month and a repeat blood test showed creatinine had gone up to 3.4.  Patient 

was also complaining of urinary symptoms and outpatient urine culture were 

positive for E. coli more than 100,000.  Patient was given Augmentin which was 

sensitive to E. coli.  Patient took some antibiotics and started complaining of 

more decreasing urine output and swelling and pain on the right ankle patient 

stated that the pain in the right ankle has been present for the last few 

months.  In the emergency room patients creatinine was 3.5 and BUN was 101 

with a potassium of 2.9.  Patient is admitted now for further evaluation.





pATIENT IMPROVED ON iv HYDRATION.


Prior to discharge the BUN was 42 and creatinine 2.5 with normal potassium.


Ultrasound of the kidney shows chronic kidney disease.  Urine urine office were 

negative.  Uric acid was elevated.  Metformin was discontinued and Januvia was 

given.  Patient's blood pressure was controlled with losartan.


Renal consult was obtained currently patient does not require any renal 

replacement therapy.


Patient is discharged we will continue Januvia amlodipine losartan urolic, and 

avoid all nephrotoxic drugs and chemicals.








Discharge Plan





- Discharge Medications


Prescriptions: 


Ergocalciferol [Drisdol 50,000 Intl Units Cap] 1 cap PO Q7D #4 cap


SITagliptin [Januvia] 25 mg PO DAILY #30 tab


amLODIPine [Norvasc] 5 mg PO DAILY #30 tab


Valsartan 160 mg PO DAILY #30 tablet





- Follow Up Plan


Condition: GOOD


Disposition: HOME/ ROUTINE


Instructions:  Sitagliptin, Heart Healthy Diet, Diabetes Exchange Diet, 

Lifestyle Changes to Manage Gout, Gout (DC), Carbohydrate Counting Diet, 

Diabetes Diet , Amlodipine, Ergocalciferol, Valsartan, Warfarin, Acute Kidney 

Injury (DC), Hypokalemia (DC)


Additional Instructions: 


- SEGUIMIENTO CON EL DR. HOOD EN LA OFICINA EN EL PLAZO DE 5-7 LUNA --- LLAME 

A LA OFICINA PARA HACER MICHELE DAMIÁN.


- SEGUIMIENTO CON EL DR. MCMAHON O DR. FRANKLIN (MDICOS DE [facility]) EN LA OFICINA EN 

EL PLAZO DE 7-10 LUNA --- LLAME A LA OFICINA PARA HACER MICHELE DAMIÁN.


ASEGRESE DE QUE SOLER TRABAJO DE DELFINA SE HIZO JOSEPH ([place], 11/8/18) EN LA 

OFICINA DEL DR. HOOD.





- WANDA MEDICAMENTOS EN CASA ESTN CAMBIANDO. STOP TOMANDO: FUROSEMIDA (LASIX), 

SUPLEMENTO DE POTASIO, METFORMINA (GLUCOPHAGE), VALSARTAN (DIOVAN).


-CONTINE TOMANDO SOLER WARFARIN (COUMADIN) DOSIS CASERA Y LEVOTHYROXINE.


-NUEVAS RECETAS QUE FREEMAN SIDO ENVIADAS A SOLER FARMACIA:


1) AMLODIPINA 5 MG (ESTO ES PARA SOLER PRESIN ARTERIAL) --- TOME 1 TABLETA POR 

BOCA MICHELE VEZ AL DA.


2) VALSARTAN (DIOVAN) 160 MG (ESTO ES PARA SOLER PRESIN ARTERIAL) --- TOME 1 

TABLETA POR BOCA MICHELE VEZ AL DA.


3) JANUVIA 25 MG (ESTO ES PARA SOLER AZCAR) --- TOME 1 TABLETA POR LA BOCA MICHELE 

VEZ AL DA ANTES DEL DESAYUNO.


4) VITAMINA D (DRISDOL) --- YESSICA 1 CPSULA POR BOCA MICHELE VEZ AL DA.





-SI TIENE ALGUNA PREGUNTA O PREOCUPACIN, CONTACTE AL DR. SHANNAN LUX.





________________________________________________________________________________

___________________________________________________________________________


-FOLLOW UP WITH DR. HOOD IN THE OFFICE WITHIN 5-7 DAYS---CALL THE OFFICE TO 

MAKE AN APPOINTMENT.


-FOLLOW UP WITH DR. MCMAHON OR DR. FRANKLIN ([facility]) IN THE OFFICE WITHIN 7-

10 DAYS---CALL THE OFFICE TO MAKE AN APPOINTMENT.


-MAKE SURE YOU HAVE YOUR BLOOD WORK DRAWN TOMORROW (SATURDAY, 8/11/18) IN THE 

OFFICE OF DR. HOOD.





-YOUR HOME MEDICATIONS ARE CHANGING.  STOP TAKING: FUROSEMIDE (LASIX), 

POTASSIUM SUPPLEMENT, METFORMIN (GLUCOPHAGE), VALSARTAN (DIOVAN).


-CONTINUE TAKING YOUR WARFARIN (COUMADIN) HOME DOSE AND LEVOTHYROXINE.


-NEW PRESCRIPTIONS THAT HAVE BEEN SENT TO YOUR PHARMACY:


 1) AMLODIPINE 5 MG (THIS IS FOR YOUR BLOOD PRESSURE)---TAKE 1 TABLET BY MOUTH 

ONCE A DAY.


 2) VALSARTAN (DIOVAN) 160 MG (THIS IS FOR YOUR BLOOD PRESSURE)---TAKE 1 TABLET 

BY MOUTH ONCE A DAY.


 3) JANUVIA 25 MG (THIS IS FOR YOUR SUGAR)---TAKE 1 TABLET BY MOUTH ONCE A DAY 

BEFORE BREAKFAST. 


 4) VITAMIN D (DRISDOL)---TAKE 1 CAPSULE BY MOUTH ONCE A DAY.





-IF YOU HAVE ANY QUESTIONS OR CONCERNS, CONTACT DR. HOOD'S OFFICE.


Referrals: 


Tam Mcmahon MD [Staff Provider] - 


Deborah Hood MD [Staff Provider] -